# Patient Record
Sex: FEMALE | Race: WHITE | Employment: PART TIME | URBAN - METROPOLITAN AREA
[De-identification: names, ages, dates, MRNs, and addresses within clinical notes are randomized per-mention and may not be internally consistent; named-entity substitution may affect disease eponyms.]

---

## 2017-03-13 ENCOUNTER — PATIENT MESSAGE (OUTPATIENT)
Dept: INTERNAL MEDICINE CLINIC | Facility: CLINIC | Age: 57
End: 2017-03-13

## 2017-03-13 DIAGNOSIS — Z12.31 VISIT FOR SCREENING MAMMOGRAM: Primary | ICD-10-CM

## 2017-03-13 NOTE — TELEPHONE ENCOUNTER
From: Jael Avina  To: Denise Philip PA-C  Sent: 3/13/2017 7:42 AM CDT  Subject: Prescription Question    Eugenio Mejia,   I have 2 questions for you.  The first is regarding my mammogram. I received a reminder to schedule my annual exam but the let

## 2017-04-03 ENCOUNTER — HOSPITAL ENCOUNTER (OUTPATIENT)
Dept: MAMMOGRAPHY | Age: 57
Discharge: HOME | End: 2017-04-03
Attending: PHYSICIAN ASSISTANT
Payer: COMMERCIAL

## 2017-04-03 DIAGNOSIS — Z12.31 VISIT FOR SCREENING MAMMOGRAM: ICD-10-CM

## 2017-04-03 PROCEDURE — 77067 SCR MAMMO BI INCL CAD: CPT

## 2017-04-25 NOTE — PROGRESS NOTES
HPI:    Patient ID: Smith Pearl is a 61year old female. HPI  Here for worsening insomnia. Awakens at 3-4 am daily. Tried sleep hygeine. Did not help. Never started eleavil. Diazepam helping the best with insomnia. Was having left back pain.  We capsule by mouth daily.  Disp:  Rfl: 3   CALCIUM + D OR 8 tabs daily Disp:  Rfl:    MULTIPLE VITAMIN OR CAPS daily Disp:  Rfl:    SYNTHROID 150 MCG OR TABS 7 DAYS A WEEK Disp:  Rfl:      Allergies:No Known Allergies   PHYSICAL EXAM:   Physical Exam   Consti

## 2017-04-25 NOTE — PATIENT INSTRUCTIONS
Treating Insomnia  Good sleeping habits are a key part of treatment. If needed, some medications may help you sleep better at first. Making healthy lifestyle changes and learning to relax can improve your sleep.  Treating insomnia takes commitment, but tr · Deep breathing. Sit or lie back in a chair. Take a slow, deep breath. Hold it for 5 counts. Then breathe out slowly through your mouth. Keep doing this until you feel relaxed. · Progressive muscle relaxation.  Tense and then relax the muscles in your bod

## 2017-07-28 ENCOUNTER — TELEPHONE (OUTPATIENT)
Dept: INTERNAL MEDICINE CLINIC | Facility: CLINIC | Age: 57
End: 2017-07-28

## 2017-07-28 NOTE — TELEPHONE ENCOUNTER
referral   Received:  Today   Message Contents   Esthela Roque [facility] Clinical Staff Cc: [facility] Referral Pool   Phone Number: [phone]             .Reason for the order/referral: referral for sleep apnea   PCP: [email or web address]   Refer to [facility]

## 2017-07-31 ENCOUNTER — OFFICE VISIT (OUTPATIENT)
Dept: INTERNAL MEDICINE CLINIC | Facility: CLINIC | Age: 57
End: 2017-07-31

## 2017-07-31 VITALS
WEIGHT: 149.5 LBS | OXYGEN SATURATION: 97 % | DIASTOLIC BLOOD PRESSURE: 70 MMHG | HEIGHT: 68 IN | HEART RATE: 67 BPM | RESPIRATION RATE: 16 BRPM | BODY MASS INDEX: 22.66 KG/M2 | TEMPERATURE: 98 F | SYSTOLIC BLOOD PRESSURE: 110 MMHG

## 2017-07-31 DIAGNOSIS — G47.10 HYPERSOMNIA: Primary | ICD-10-CM

## 2017-07-31 DIAGNOSIS — F51.04 PSYCHOPHYSIOLOGICAL INSOMNIA: ICD-10-CM

## 2017-07-31 PROCEDURE — 99214 OFFICE O/P EST MOD 30 MIN: CPT | Performed by: INTERNAL MEDICINE

## 2017-07-31 RX ORDER — CHOLECALCIFEROL (VITAMIN D3) 125 MCG
1 CAPSULE ORAL NIGHTLY
COMMUNITY

## 2017-07-31 NOTE — PATIENT INSTRUCTIONS
Sleep and Women: Life Stages  A woman goes through many stages during her lifetime. These stages are a natural part of being a woman. Physical and emotional changes take place during the menstrual cycle, pregnancy, motherhood, and menopause.  These change · Try to adjust your baby’s sleep to fit a day-night cycle. At night, have lights dim and the setting quiet. During the day, keep your baby active longer. Then he or she will sleep better at night. · Take a daily walk with your baby.  Fresh air and dayligh

## 2017-07-31 NOTE — PROGRESS NOTES
HPI:    Patient ID: Seamus Gallagher is a 61year old female. HPI  Follow up on insomnia. Had been doing well on doxepin and not needed to use diazepam for insomnia.  Recently sleep onset has been controlled but now pt has been awakening at 3 am and Jose Alberto Sol 5 MG Oral Tab TAKE ONE TABLET BY MOUTH EVERY 8 HOURS AS NEEDED FOR MUSCLE SPASM Disp: 30 tablet Rfl: 1   naproxen 500 MG Oral Tab Take 1 tablet (500 mg total) by mouth 2 (two) times daily with meals.  Disp: 60 tablet Rfl: 0   calciTRIOL (ROCALTROL) 0.25 MCG

## 2017-08-15 DIAGNOSIS — F51.04 CHRONIC INSOMNIA: ICD-10-CM

## 2017-08-15 RX ORDER — DOXEPIN HYDROCHLORIDE 25 MG/1
25 CAPSULE ORAL NIGHTLY
Qty: 30 CAPSULE | Refills: 2 | Status: SHIPPED | OUTPATIENT
Start: 2017-08-15 | End: 2017-11-09

## 2017-10-15 ENCOUNTER — OFFICE VISIT (OUTPATIENT)
Dept: SLEEP CENTER | Facility: HOSPITAL | Age: 57
End: 2017-10-15
Attending: INTERNAL MEDICINE
Payer: COMMERCIAL

## 2017-10-15 PROCEDURE — 95806 SLEEP STUDY UNATT&RESP EFFT: CPT

## 2017-10-17 ENCOUNTER — HOSPITAL ENCOUNTER (OUTPATIENT)
Dept: ULTRASOUND IMAGING | Age: 57
Discharge: HOME | End: 2017-10-17
Attending: INTERNAL MEDICINE
Payer: COMMERCIAL

## 2017-10-17 DIAGNOSIS — C73 MALIGNANT NEOPLASM OF THYROID GLAND (HCC): ICD-10-CM

## 2017-10-17 PROCEDURE — 76536 US EXAM OF HEAD AND NECK: CPT | Performed by: INTERNAL MEDICINE

## 2017-10-18 NOTE — PROCEDURES
[address]       Accredited by the [facility] ([facility])    PATIENT'S NAME:        Navdeep Miramontes  ATTENDING PHYSICIAN:   Ari Robb M.D. REFERRING PHYSICIAN:   CLOVER Singh patient's chief complaint is insomnia. RECOMMENDATIONS:    1.    In the absence of significant sleep pathology as demonstrated during this home sleep study evaluation, further clinical correlation is necessary to determine etiology of patient's ongoing s

## 2017-10-27 ENCOUNTER — PATIENT MESSAGE (OUTPATIENT)
Dept: INTERNAL MEDICINE CLINIC | Facility: CLINIC | Age: 57
End: 2017-10-27

## 2017-10-27 NOTE — TELEPHONE ENCOUNTER
From: Aiden Fritz  To: Garret Krishnamurthy MD  Sent: 10/27/2017 12:17 PM CDT  Subject: Test Results Question    I had my sleep study done on October 15 th at the [facility] on [address] [place] [address].  I am wondering if the results have come back yet and was t

## 2017-10-27 NOTE — TELEPHONE ENCOUNTER
Per Dr. Ximena Rosado,   RECOMMENDATIONS:    1. In the absence of significant sleep pathology as demonstrated during this home sleep study evaluation, further clinical correlation is necessary to determine etiology of patient's ongoing symptoms.   If there

## 2017-11-09 DIAGNOSIS — F51.04 CHRONIC INSOMNIA: ICD-10-CM

## 2017-11-09 RX ORDER — DOXEPIN HYDROCHLORIDE 25 MG/1
CAPSULE ORAL
Qty: 30 CAPSULE | Refills: 0 | Status: SHIPPED | OUTPATIENT
Start: 2017-11-09 | End: 2017-12-19 | Stop reason: DRUGHIGH

## 2017-12-19 NOTE — PROGRESS NOTES
HPI:    Patient ID: Rober Burt is a 61year old female. HPI     Follow up after sleep study showed no significant sleep apnea. Pt on doxepin nightly 25mg and insomnia is not well controlled. Also takes magnesium, melatonin.      C/o frequent wakin Neck: Normal range of motion. Cardiovascular: Normal rate, regular rhythm, normal heart sounds and intact distal pulses. No murmur heard. Pulmonary/Chest: Effort normal and breath sounds normal. She has no wheezes. She has no rales.    Neurological:

## 2017-12-21 NOTE — PATIENT INSTRUCTIONS
Start increased dose of doxepin for insomnia  What can I do to improve my insomnia? — You can follow good “sleep hygiene.” That means that you:  ? Sleep only long enough to feel rested and then get out of bed  ? Go to bed and get up at the same time every da at first, it disrupts sleep later in the night. All topics are updated as new evidence becomes available and our peer review process is complete. This topic retrieved from orat.ioDate on: May 22, 2015.

## 2017-12-29 ENCOUNTER — MED REC SCAN ONLY (OUTPATIENT)
Dept: INTERNAL MEDICINE CLINIC | Facility: CLINIC | Age: 57
End: 2017-12-29

## 2018-05-02 ENCOUNTER — OFFICE VISIT (OUTPATIENT)
Dept: INTERNAL MEDICINE CLINIC | Facility: CLINIC | Age: 58
End: 2018-05-02

## 2018-05-02 VITALS
TEMPERATURE: 99 F | DIASTOLIC BLOOD PRESSURE: 60 MMHG | HEIGHT: 68 IN | RESPIRATION RATE: 16 BRPM | SYSTOLIC BLOOD PRESSURE: 100 MMHG | HEART RATE: 76 BPM | BODY MASS INDEX: 24.1 KG/M2 | WEIGHT: 159 LBS

## 2018-05-02 DIAGNOSIS — F51.04 PSYCHOPHYSIOLOGICAL INSOMNIA: ICD-10-CM

## 2018-05-02 DIAGNOSIS — K64.4 EXTERNAL HEMORRHOIDS: Primary | ICD-10-CM

## 2018-05-02 DIAGNOSIS — H81.11 BENIGN PAROXYSMAL POSITIONAL VERTIGO OF RIGHT EAR: ICD-10-CM

## 2018-05-02 PROCEDURE — 99214 OFFICE O/P EST MOD 30 MIN: CPT | Performed by: PHYSICIAN ASSISTANT

## 2018-05-02 RX ORDER — PREDNISONE 20 MG/1
20 TABLET ORAL 2 TIMES DAILY
Qty: 14 TABLET | Refills: 0 | Status: SHIPPED | OUTPATIENT
Start: 2018-05-02 | End: 2018-05-09

## 2018-05-05 NOTE — PATIENT INSTRUCTIONS
Continue current medications  If vertigo returns: take prednisone as directed (with food)  Use proctofoam three times daily as needed for hemorrhoids.  If bleeding continues, we will recommend a follow-up with gastroenterologist

## 2018-05-05 NOTE — PROGRESS NOTES
HPI:    Patient ID: Luda Bundy is a 61year old female.   Patient presents with:  Vertigo: walked into closet door during the night and bumped forehead, has dizziness when she lies down and when sitting up, noticing when she types she is flipping let issues. Review of Systems   Constitutional: Negative for activity change, appetite change, diaphoresis, fatigue, fever and unexpected weight change. HENT: Negative for congestion and sore throat. Eyes: Negative for photophobia and pain.    Respir Disp:  Rfl:      Allergies:No Known Allergies   PHYSICAL EXAM:   Physical Exam   Nursing note and vitals reviewed. Constitutional: She is oriented to person, place, and time. She appears well-developed and well-nourished. No distress.    HENT:   Head: Nor Tab 14 tablet 0      Sig: Take 1 tablet (20 mg total) by mouth 2 (two) times daily.       Pramoxine-HC 1-1 % External Foam 10 g 1      Sig: Apply rectally 3 times daily as needed           Imaging & Referrals:  None         IT#[id number]

## 2018-05-21 ENCOUNTER — NURSE ONLY (OUTPATIENT)
Dept: INTERNAL MEDICINE CLINIC | Facility: CLINIC | Age: 58
End: 2018-05-21

## 2018-05-21 DIAGNOSIS — Z23 NEED FOR SHINGLES VACCINE: Primary | ICD-10-CM

## 2018-05-21 PROCEDURE — 90471 IMMUNIZATION ADMIN: CPT | Performed by: INTERNAL MEDICINE

## 2018-05-21 PROCEDURE — 90750 HZV VACC RECOMBINANT IM: CPT | Performed by: INTERNAL MEDICINE

## 2018-06-23 RX ORDER — DOXEPIN HYDROCHLORIDE 50 MG/1
50 CAPSULE ORAL NIGHTLY
Qty: 30 CAPSULE | Refills: 5 | Status: CANCELLED
Start: 2018-06-23

## 2018-06-23 NOTE — TELEPHONE ENCOUNTER
From: Michael Ibarra  Sent: 6/23/2018 10:19 AM CDT  Subject: Medication Renewal Request    Neva Jaramillo.  Dania Carrasquillo would like a refill of the following medications:     Doxepin HCl 50 MG Oral Cap Rosanne Cee PA-C]    Preferred pharmacy: [facility]

## 2018-07-13 ENCOUNTER — HOSPITAL ENCOUNTER (OUTPATIENT)
Dept: MAMMOGRAPHY | Age: 58
Discharge: HOME | End: 2018-07-13
Attending: PHYSICIAN ASSISTANT
Payer: COMMERCIAL

## 2018-07-13 DIAGNOSIS — Z12.31 ENCOUNTER FOR SCREENING MAMMOGRAM FOR MALIGNANT NEOPLASM OF BREAST: ICD-10-CM

## 2018-07-13 PROCEDURE — 77063 BREAST TOMOSYNTHESIS BI: CPT | Performed by: PHYSICIAN ASSISTANT

## 2018-07-13 PROCEDURE — 77067 SCR MAMMO BI INCL CAD: CPT | Performed by: PHYSICIAN ASSISTANT

## 2018-08-06 ENCOUNTER — NURSE ONLY (OUTPATIENT)
Dept: INTERNAL MEDICINE CLINIC | Facility: CLINIC | Age: 58
End: 2018-08-06
Payer: COMMERCIAL

## 2018-08-06 DIAGNOSIS — Z23 NEED FOR SHINGLES VACCINE: Primary | ICD-10-CM

## 2018-08-06 PROCEDURE — 90471 IMMUNIZATION ADMIN: CPT | Performed by: INTERNAL MEDICINE

## 2018-08-06 PROCEDURE — 90750 HZV VACC RECOMBINANT IM: CPT | Performed by: INTERNAL MEDICINE

## 2018-10-30 ENCOUNTER — OFFICE VISIT (OUTPATIENT)
Dept: INTERNAL MEDICINE CLINIC | Facility: CLINIC | Age: 58
End: 2018-10-30
Payer: COMMERCIAL

## 2018-10-30 VITALS
HEIGHT: 68 IN | TEMPERATURE: 98 F | SYSTOLIC BLOOD PRESSURE: 108 MMHG | DIASTOLIC BLOOD PRESSURE: 70 MMHG | WEIGHT: 165 LBS | HEART RATE: 60 BPM | BODY MASS INDEX: 25.01 KG/M2 | RESPIRATION RATE: 16 BRPM

## 2018-10-30 DIAGNOSIS — G57.02 PIRIFORMIS SYNDROME OF LEFT SIDE: ICD-10-CM

## 2018-10-30 PROCEDURE — 99213 OFFICE O/P EST LOW 20 MIN: CPT | Performed by: PHYSICIAN ASSISTANT

## 2018-10-30 RX ORDER — DIAZEPAM 5 MG/1
TABLET ORAL
Qty: 30 TABLET | Refills: 0 | Status: SHIPPED | OUTPATIENT
Start: 2018-10-30 | End: 2018-12-27

## 2018-10-30 RX ORDER — MELOXICAM 15 MG/1
15 TABLET ORAL DAILY
Qty: 14 TABLET | Refills: 0 | Status: SHIPPED | OUTPATIENT
Start: 2018-10-30 | End: 2019-07-15 | Stop reason: ALTCHOICE

## 2018-10-30 NOTE — PATIENT INSTRUCTIONS
Start physical therapy for piriformis syndrome  Take meloxicam once daily with food for pain  Use diazepam if necessary when pain still present

## 2018-10-30 NOTE — PROGRESS NOTES
HPI:    Patient ID: Marina Sheppard is a 60year old female. Patient presents with:  Hip Pain: left requesting PT order   Medication Request: diazepam     Hip Pain    Pain location: left buttock/hip. This is a recurrent problem. Episode onset: 2 months. D OR) Take 1 tablet by mouth daily. Disp:  Rfl:    Cyanocobalamin (VITAMIN B 12 OR) Take 1 tablet by mouth daily. Disp:  Rfl:    B Complex-C (SUPER B COMPLEX OR) Take 1 tablet by mouth daily.    Disp:  Rfl:    calciTRIOL (ROCALTROL) 0.25 MCG Oral Cap Ta Signed Prescriptions Disp Refills   • Meloxicam 15 MG Oral Tab 14 tablet 0     Sig: Take 1 tablet (15 mg total) by mouth daily.    • diazepam 5 MG Oral Tab 30 tablet 0     Sig: TAKE ONE TABLET BY MOUTH EVERY 8 HOURS AS NEEDED FOR MUSCLE SPASM       Imag

## 2018-12-04 DIAGNOSIS — F51.04 PSYCHOPHYSIOLOGICAL INSOMNIA: ICD-10-CM

## 2018-12-04 RX ORDER — DOXEPIN HYDROCHLORIDE 50 MG/1
CAPSULE ORAL
Qty: 30 CAPSULE | Refills: 0 | Status: SHIPPED | OUTPATIENT
Start: 2018-12-04 | End: 2019-02-25

## 2018-12-08 DIAGNOSIS — G57.02 PIRIFORMIS SYNDROME OF LEFT SIDE: ICD-10-CM

## 2018-12-10 DIAGNOSIS — G57.02 PIRIFORMIS SYNDROME OF LEFT SIDE: ICD-10-CM

## 2018-12-10 RX ORDER — DIAZEPAM 5 MG/1
TABLET ORAL
Qty: 30 TABLET | Refills: 0 | OUTPATIENT
Start: 2018-12-10

## 2018-12-10 NOTE — TELEPHONE ENCOUNTER
Per Dr. Mary Powell, the pt needs to be seen in the office for re-evaluation before further refills. The pharmacy was notified and the pt was informed of this via [address]

## 2018-12-10 NOTE — TELEPHONE ENCOUNTER
Rx not sent. Per Dr. Angela Scott, the pt is to be seen in the office for a recheck for further refills.

## 2018-12-27 DIAGNOSIS — G57.02 PIRIFORMIS SYNDROME OF LEFT SIDE: ICD-10-CM

## 2018-12-27 RX ORDER — DIAZEPAM 5 MG/1
TABLET ORAL
Qty: 30 TABLET | Refills: 2 | OUTPATIENT
Start: 2018-12-27 | End: 2019-07-15

## 2018-12-27 NOTE — TELEPHONE ENCOUNTER
Pt is requesting a refill for diazepam with additional refills for leg cramps at night. Ok to refill per Dr. Jean Claude Andrews. Rx called into the pharmacy.

## 2019-01-01 DIAGNOSIS — F51.04 PSYCHOPHYSIOLOGICAL INSOMNIA: ICD-10-CM

## 2019-01-02 RX ORDER — DOXEPIN HYDROCHLORIDE 50 MG/1
CAPSULE ORAL
Qty: 30 CAPSULE | Refills: 0 | OUTPATIENT
Start: 2019-01-02

## 2019-01-02 NOTE — TELEPHONE ENCOUNTER
Spoke with pt to make appt before we prescribe further refills, and pt did not want to schedule f/u appt. Pt states she would rather try to take otc meds for insomnia.

## 2019-02-25 ENCOUNTER — PATIENT MESSAGE (OUTPATIENT)
Dept: INTERNAL MEDICINE CLINIC | Facility: CLINIC | Age: 59
End: 2019-02-25

## 2019-02-25 DIAGNOSIS — F51.04 PSYCHOPHYSIOLOGICAL INSOMNIA: ICD-10-CM

## 2019-02-25 RX ORDER — DOXEPIN HYDROCHLORIDE 50 MG/1
CAPSULE ORAL
Qty: 30 CAPSULE | Refills: 0 | Status: CANCELLED | OUTPATIENT
Start: 2019-02-25

## 2019-02-25 RX ORDER — DOXEPIN HYDROCHLORIDE 50 MG/1
CAPSULE ORAL
Qty: 30 CAPSULE | Refills: 3 | Status: SHIPPED | OUTPATIENT
Start: 2019-02-25 | End: 2019-07-15

## 2019-02-25 NOTE — TELEPHONE ENCOUNTER
From: Antonio Bella  To: Robert Kasper PA-C  Sent: 2/25/2019 12:38 PM CST  Subject: Prescription Question    JACQUI Mejia!  I saw you the end of Oct. and now I need to refill my Doxepin ( I use for sleep 50 mg at night) They said I had to come back i

## 2019-06-20 ENCOUNTER — ORDER TRANSCRIPTION (OUTPATIENT)
Dept: ADMINISTRATIVE | Facility: HOSPITAL | Age: 59
End: 2019-06-20

## 2019-06-20 ENCOUNTER — PATIENT MESSAGE (OUTPATIENT)
Dept: INTERNAL MEDICINE CLINIC | Facility: CLINIC | Age: 59
End: 2019-06-20

## 2019-06-20 DIAGNOSIS — Z12.31 ENCOUNTER FOR SCREENING MAMMOGRAM FOR MALIGNANT NEOPLASM OF BREAST: Primary | ICD-10-CM

## 2019-06-20 NOTE — TELEPHONE ENCOUNTER
From: Reena Benson  To: Tammy Torres PA-C  Sent: 6/20/2019 10:50 AM CDT  Subject: Non-Urgent Medical Question    I need to schedule an annual exam. I have a mammogram appointment made for July 15 & would like to come in some time after to see JOSE RAFAEL

## 2019-06-22 ENCOUNTER — HOSPITAL ENCOUNTER (OUTPATIENT)
Dept: ULTRASOUND IMAGING | Age: 59
Discharge: HOME | End: 2019-06-22
Attending: INTERNAL MEDICINE
Payer: COMMERCIAL

## 2019-06-22 DIAGNOSIS — C73 THYROID CANCER (HCC): ICD-10-CM

## 2019-06-22 PROCEDURE — 76536 US EXAM OF HEAD AND NECK: CPT | Performed by: INTERNAL MEDICINE

## 2019-07-15 ENCOUNTER — OFFICE VISIT (OUTPATIENT)
Dept: INTERNAL MEDICINE CLINIC | Facility: CLINIC | Age: 59
End: 2019-07-15
Payer: COMMERCIAL

## 2019-07-15 ENCOUNTER — HOSPITAL ENCOUNTER (OUTPATIENT)
Dept: MAMMOGRAPHY | Age: 59
Discharge: HOME | End: 2019-07-15
Attending: PHYSICIAN ASSISTANT
Payer: COMMERCIAL

## 2019-07-15 VITALS
RESPIRATION RATE: 12 BRPM | SYSTOLIC BLOOD PRESSURE: 106 MMHG | BODY MASS INDEX: 20 KG/M2 | HEIGHT: 68 IN | TEMPERATURE: 98 F | WEIGHT: 132 LBS | HEART RATE: 84 BPM | DIASTOLIC BLOOD PRESSURE: 64 MMHG

## 2019-07-15 DIAGNOSIS — E89.2 SURGICAL HYPOPARATHYROIDISM (HCC): ICD-10-CM

## 2019-07-15 DIAGNOSIS — R25.2 LEG CRAMPING: ICD-10-CM

## 2019-07-15 DIAGNOSIS — Z12.4 SCREENING FOR MALIGNANT NEOPLASM OF CERVIX: ICD-10-CM

## 2019-07-15 DIAGNOSIS — M85.80 OSTEOPENIA, UNSPECIFIED LOCATION: ICD-10-CM

## 2019-07-15 DIAGNOSIS — F51.04 PSYCHOPHYSIOLOGICAL INSOMNIA: ICD-10-CM

## 2019-07-15 DIAGNOSIS — Z12.31 ENCOUNTER FOR SCREENING MAMMOGRAM FOR MALIGNANT NEOPLASM OF BREAST: ICD-10-CM

## 2019-07-15 DIAGNOSIS — Z85.850 HISTORY OF THYROID CANCER: ICD-10-CM

## 2019-07-15 DIAGNOSIS — G57.02 PIRIFORMIS SYNDROME OF LEFT SIDE: ICD-10-CM

## 2019-07-15 DIAGNOSIS — Z00.00 ROUTINE PHYSICAL EXAMINATION: Primary | ICD-10-CM

## 2019-07-15 DIAGNOSIS — Z01.89 ENCOUNTER FOR ROUTINE LABORATORY TESTING: ICD-10-CM

## 2019-07-15 PROCEDURE — 99396 PREV VISIT EST AGE 40-64: CPT | Performed by: PHYSICIAN ASSISTANT

## 2019-07-15 PROCEDURE — 88175 CYTOPATH C/V AUTO FLUID REDO: CPT | Performed by: PHYSICIAN ASSISTANT

## 2019-07-15 PROCEDURE — 77067 SCR MAMMO BI INCL CAD: CPT | Performed by: PHYSICIAN ASSISTANT

## 2019-07-15 PROCEDURE — 87624 HPV HI-RISK TYP POOLED RSLT: CPT | Performed by: PHYSICIAN ASSISTANT

## 2019-07-15 PROCEDURE — 77063 BREAST TOMOSYNTHESIS BI: CPT | Performed by: PHYSICIAN ASSISTANT

## 2019-07-15 RX ORDER — LEVOTHYROXINE SODIUM 137 UG/1
137 TABLET ORAL
COMMUNITY
End: 2019-08-12

## 2019-07-15 RX ORDER — DOXEPIN HYDROCHLORIDE 50 MG/1
CAPSULE ORAL
Qty: 30 CAPSULE | Refills: 3 | Status: SHIPPED | OUTPATIENT
Start: 2019-07-15 | End: 2019-11-07

## 2019-07-15 RX ORDER — POTASSIUM CITRATE 15 MEQ/1
2 TABLET, EXTENDED RELEASE ORAL DAILY
COMMUNITY

## 2019-07-15 RX ORDER — ZOLPIDEM TARTRATE 5 MG/1
1 TABLET ORAL NIGHTLY PRN
Refills: 3 | COMMUNITY
Start: 2019-04-25 | End: 2020-07-17

## 2019-07-15 RX ORDER — DIAZEPAM 5 MG/1
TABLET ORAL
Qty: 30 TABLET | Refills: 2 | Status: SHIPPED | OUTPATIENT
Start: 2019-07-15 | End: 2020-07-17

## 2019-07-15 NOTE — PROGRESS NOTES
Wellness Exam    CC: Patient is presenting for a wellness exam    HPI:   Concerns: keto diet x several months, sees endo for thyroid management and since starting the diet has had success with wt loss, feeling well.  Requesting parathyroid labs check  Insom • MARCOS BIOPSY STEREOTACTIC NODULE 2 SITE BILAT  2012    •   x3   • OTHER SURGICAL HISTORY      Malignant Thyroid tumor   • REMOVAL HARDWARE LOWER EXTREMITY Left 2009    Performed by Lashon Madrigal MD at [address] [place] [address] urgency, frequency of urination, and abnormal vaginal bleeding. Musculoskeletal: Negative for arthralgias and gait problem. Skin: Negative for color change and rash. Neurological: Negative for tremors, weakness and numbness.    Hematological: Negative and affect and her behavior is normal.     Assessment and Plan:  Tiffany Dominguez is a 60year old female here for a wellness exam.  Age appropriate cancer screening, labs, safety, immunizations were discussed with the patient and ordered as follows:

## 2019-07-16 LAB — HPV I/H RISK 1 DNA SPEC QL NAA+PROBE: NEGATIVE

## 2019-07-17 LAB
LAST PAP RESULT: NORMAL
PAP HISTORY (OTHER THAN LAST PAP): NORMAL

## 2019-07-18 LAB
ABSOLUTE BASOPHILS: 49 CELLS/UL (ref 0–200)
ABSOLUTE EOSINOPHILS: 238 CELLS/UL (ref 15–500)
ABSOLUTE LYMPHOCYTES: 951 CELLS/UL (ref 850–3900)
ABSOLUTE MONOCYTES: 278 CELLS/UL (ref 200–950)
ABSOLUTE NEUTROPHILS: 1383 CELLS/UL (ref 1500–7800)
ALBUMIN/GLOBULIN RATIO: 2.1 (CALC) (ref 1–2.5)
ALBUMIN: 4.5 G/DL (ref 3.6–5.1)
ALKALINE PHOSPHATASE: 45 U/L (ref 33–130)
ALT: 15 U/L (ref 6–29)
APPEARANCE: CLEAR
AST: 15 U/L (ref 10–35)
BASOPHILS: 1.7 %
BILIRUBIN, TOTAL: 0.5 MG/DL (ref 0.2–1.2)
BILIRUBIN: NEGATIVE
BUN: 24 MG/DL (ref 7–25)
CALCIUM, IONIZED: 4.7 MG/DL (ref 4.8–5.6)
CALCIUM: 9.2 MG/DL (ref 8.6–10.4)
CARBON DIOXIDE: 30 MMOL/L (ref 20–32)
CHLORIDE: 101 MMOL/L (ref 98–110)
CHOL/HDLC RATIO: 3.4 (CALC)
CHOLESTEROL, TOTAL: 294 MG/DL
COLOR: YELLOW
CREATININE: 0.58 MG/DL (ref 0.5–0.99)
EGFR IF AFRICN AM: 116 ML/MIN/1.73M2
EGFR IF NONAFRICN AM: 100 ML/MIN/1.73M2
EOSINOPHILS: 8.2 %
FERRITIN: 13 NG/ML (ref 16–232)
GLOBULIN: 2.1 G/DL (CALC) (ref 1.9–3.7)
GLUCOSE: 99 MG/DL (ref 65–99)
GLUCOSE: NEGATIVE
HDL CHOLESTEROL: 86 MG/DL
HEMATOCRIT: 36.4 % (ref 35–45)
HEMOGLOBIN A1C: 5.2 % OF TOTAL HGB
HEMOGLOBIN: 11.8 G/DL (ref 11.7–15.5)
LDL-CHOLESTEROL: 193 MG/DL (CALC)
LEUKOCYTE ESTERASE: NEGATIVE
LYMPHOCYTES: 32.8 %
MAGNESIUM: 2.2 MG/DL (ref 1.5–2.5)
MCH: 29.9 PG (ref 27–33)
MCHC: 32.4 G/DL (ref 32–36)
MCV: 92.2 FL (ref 80–100)
MONOCYTES: 9.6 %
MPV: 10.2 FL (ref 7.5–12.5)
NEUTROPHILS: 47.7 %
NITRITE: NEGATIVE
NON-HDL CHOLESTEROL: 208 MG/DL (CALC)
OCCULT BLOOD: NEGATIVE
PARATHYROID HORMONE,$INTACT: 22 PG/ML (ref 14–64)
PH: 6 (ref 5–8)
PLATELET COUNT: 217 THOUSAND/UL (ref 140–400)
POTASSIUM: 4 MMOL/L (ref 3.5–5.3)
PROTEIN, TOTAL: 6.6 G/DL (ref 6.1–8.1)
PROTEIN: NEGATIVE
RDW: 13.1 % (ref 11–15)
RED BLOOD CELL COUNT: 3.95 MILLION/UL (ref 3.8–5.1)
SODIUM: 140 MMOL/L (ref 135–146)
SPECIFIC GRAVITY: 1.03 (ref 1–1.03)
T4, FREE: 2 NG/DL (ref 0.8–1.8)
TRIGLYCERIDES: 58 MG/DL
TSH W/REFLEX TO FT4: 0.2 MIU/L (ref 0.4–4.5)
VITAMIN D, 25-OH, TOTAL: 51 NG/ML (ref 30–100)
WHITE BLOOD CELL COUNT: 2.9 THOUSAND/UL (ref 3.8–10.8)

## 2019-07-19 ENCOUNTER — TELEPHONE (OUTPATIENT)
Dept: INTERNAL MEDICINE CLINIC | Facility: CLINIC | Age: 59
End: 2019-07-19

## 2019-07-19 DIAGNOSIS — R79.0 LOW SERUM FERRITIN LEVEL: Primary | ICD-10-CM

## 2019-07-19 RX ORDER — PNV NO.95/FERROUS FUM/FOLIC AC 28MG-0.8MG
1 TABLET ORAL DAILY
COMMUNITY
Start: 2019-07-19

## 2019-07-19 NOTE — TELEPHONE ENCOUNTER
The pt is aware and the medication list was updated.      Recommendations were sent to the pt through [employer] per pt request.

## 2019-07-19 NOTE — TELEPHONE ENCOUNTER
----- Message from Luana Cuevas PA-C sent at 7/18/2019  9:45 PM CDT -----  Please inform pt: no anemia, no evidence of DM, kidney/liver disease, UA clear, magnesium, PTH, vitamin D wnl.    Significant elevation of LDL due to keto diet- consider marin

## 2019-11-07 ENCOUNTER — PATIENT MESSAGE (OUTPATIENT)
Dept: INTERNAL MEDICINE CLINIC | Facility: CLINIC | Age: 59
End: 2019-11-07

## 2019-11-07 DIAGNOSIS — F51.04 PSYCHOPHYSIOLOGICAL INSOMNIA: ICD-10-CM

## 2019-11-07 RX ORDER — DOXEPIN HYDROCHLORIDE 50 MG/1
CAPSULE ORAL
Qty: 90 CAPSULE | Refills: 1 | Status: SHIPPED | OUTPATIENT
Start: 2019-11-07 | End: 2020-06-01

## 2019-11-07 NOTE — TELEPHONE ENCOUNTER
From: Reema End  To: Lisa Jarrett PA-C  Sent: 11/7/2019 12:12 PM CST  Subject: Prescription Question    Hi! I have new insurance that offers mail order prescription service with much better pricing!  The Doxepin is helping with sleep, and I ca

## 2020-06-01 DIAGNOSIS — F51.04 PSYCHOPHYSIOLOGICAL INSOMNIA: ICD-10-CM

## 2020-06-01 RX ORDER — DOXEPIN HYDROCHLORIDE 50 MG/1
CAPSULE ORAL
Qty: 90 CAPSULE | Refills: 0 | Status: SHIPPED | OUTPATIENT
Start: 2020-06-01 | End: 2020-09-14

## 2020-07-17 ENCOUNTER — OFFICE VISIT (OUTPATIENT)
Dept: INTERNAL MEDICINE CLINIC | Facility: CLINIC | Age: 60
End: 2020-07-17
Payer: COMMERCIAL

## 2020-07-17 VITALS
DIASTOLIC BLOOD PRESSURE: 70 MMHG | TEMPERATURE: 97 F | SYSTOLIC BLOOD PRESSURE: 118 MMHG | OXYGEN SATURATION: 99 % | BODY MASS INDEX: 21.03 KG/M2 | HEIGHT: 67 IN | WEIGHT: 134 LBS | HEART RATE: 64 BPM | RESPIRATION RATE: 18 BRPM

## 2020-07-17 DIAGNOSIS — Z13.0 SCREENING FOR ENDOCRINE, NUTRITIONAL, METABOLIC AND IMMUNITY DISORDER: ICD-10-CM

## 2020-07-17 DIAGNOSIS — Z13.228 SCREENING FOR ENDOCRINE, NUTRITIONAL, METABOLIC AND IMMUNITY DISORDER: ICD-10-CM

## 2020-07-17 DIAGNOSIS — Z13.89 SCREENING FOR GENITOURINARY CONDITION: ICD-10-CM

## 2020-07-17 DIAGNOSIS — Z12.31 ENCOUNTER FOR SCREENING MAMMOGRAM FOR BREAST CANCER: ICD-10-CM

## 2020-07-17 DIAGNOSIS — Z13.220 SCREENING FOR LIPID DISORDERS: ICD-10-CM

## 2020-07-17 DIAGNOSIS — Z13.29 SCREENING FOR ENDOCRINE, NUTRITIONAL, METABOLIC AND IMMUNITY DISORDER: ICD-10-CM

## 2020-07-17 DIAGNOSIS — Z00.00 ANNUAL PHYSICAL EXAM: Primary | ICD-10-CM

## 2020-07-17 DIAGNOSIS — Z13.21 SCREENING FOR ENDOCRINE, NUTRITIONAL, METABOLIC AND IMMUNITY DISORDER: ICD-10-CM

## 2020-07-17 DIAGNOSIS — G57.02 PIRIFORMIS SYNDROME OF LEFT SIDE: ICD-10-CM

## 2020-07-17 DIAGNOSIS — Z13.1 SCREENING FOR DIABETES MELLITUS: ICD-10-CM

## 2020-07-17 PROCEDURE — 3078F DIAST BP <80 MM HG: CPT | Performed by: NURSE PRACTITIONER

## 2020-07-17 PROCEDURE — 3008F BODY MASS INDEX DOCD: CPT | Performed by: NURSE PRACTITIONER

## 2020-07-17 PROCEDURE — 3074F SYST BP LT 130 MM HG: CPT | Performed by: NURSE PRACTITIONER

## 2020-07-17 PROCEDURE — 99396 PREV VISIT EST AGE 40-64: CPT | Performed by: NURSE PRACTITIONER

## 2020-07-17 RX ORDER — DIAZEPAM 5 MG/1
TABLET ORAL
Qty: 30 TABLET | Refills: 2 | Status: SHIPPED | OUTPATIENT
Start: 2020-07-17

## 2020-07-17 RX ORDER — ZOLPIDEM TARTRATE 5 MG/1
5 TABLET ORAL NIGHTLY PRN
Qty: 30 TABLET | Refills: 3 | Status: SHIPPED | OUTPATIENT
Start: 2020-07-17

## 2020-07-17 NOTE — PROGRESS NOTES
Wellness Exam    CC: Patient is presenting for a wellness exam    HPI:   Concerns: Feeling well. Follows with endo regarding hypoparathyroidism and hx of thyroid CA. Insomnia has been stable, using Ambien or diazepam as needed.      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 Review of Systems   Constitutional: Negative for fever, chills and fatigue. HENT: Negative for hearing loss, congestion, sore throat and neck pain. Eyes: Negative for pain and visual disturbance.    Respiratory: Negative f oriented to person, place, and time. DTRs are +2 and symmetric. Cranial nerves grossly intact. Skin: Skin is warm. No rash noted. No erythema, pallor or jaundice.    Psychiatric: She has a normal mood and affect and her behavior is normal.     Assessment a

## 2020-07-17 NOTE — PATIENT INSTRUCTIONS
Prevention Guidelines, Women Ages 48 to 59  Screening tests and vaccines are an important part of managing your health. A screening test is done to find possible disorders or diseases in people who don't have any symptoms.  The goal is to find a disease e Colorectal cancer All women at average risk in this age group Multiple tests are available and are used at different times.  Possible tests include:  · Flexible sigmoidoscopy every 5 years, or  · Colonoscopy every 10 years, or  · CT colonography (virtual co Chickenpox (varicella) All women in this age group who have no record of this infection or vaccine 2 doses; the second dose should be given at least 4 weeks after the first dose   Hepatitis A Women at increased risk for infection – talk with your healthcar Diet and exercise Women who are overweight or obese When diagnosed, and then at routine exams   Sexually transmitted infection prevention Women at increased risk for infection – talk with your healthcare provider At routine exams   Use of daily aspirin Wom No

## 2020-07-26 LAB
ABSOLUTE BASOPHILS: 60 CELLS/UL (ref 0–200)
ABSOLUTE EOSINOPHILS: 312 CELLS/UL (ref 15–500)
ABSOLUTE LYMPHOCYTES: 1407 CELLS/UL (ref 850–3900)
ABSOLUTE MONOCYTES: 280 CELLS/UL (ref 200–950)
ABSOLUTE NEUTROPHILS: 1442 CELLS/UL (ref 1500–7800)
ALBUMIN/GLOBULIN RATIO: 2.4 (CALC) (ref 1–2.5)
ALBUMIN: 4.6 G/DL (ref 3.6–5.1)
ALKALINE PHOSPHATASE: 38 U/L (ref 37–153)
ALT: 18 U/L (ref 6–29)
APPEARANCE: CLEAR
AST: 20 U/L (ref 10–35)
BASOPHILS: 1.7 %
BILIRUBIN, TOTAL: 0.5 MG/DL (ref 0.2–1.2)
BILIRUBIN: NEGATIVE
BUN/CREATININE RATIO: 47 (CALC) (ref 6–22)
BUN: 29 MG/DL (ref 7–25)
CALCIUM: 9.4 MG/DL (ref 8.6–10.4)
CARBON DIOXIDE: 30 MMOL/L (ref 20–32)
CHLORIDE: 104 MMOL/L (ref 98–110)
CHOL/HDLC RATIO: 3.3 (CALC)
CHOLESTEROL, TOTAL: 311 MG/DL
COLOR: YELLOW
CREATININE: 0.62 MG/DL (ref 0.5–0.99)
EGFR IF AFRICN AM: 113 ML/MIN/1.73M2
EGFR IF NONAFRICN AM: 97 ML/MIN/1.73M2
EOSINOPHILS: 8.9 %
GLOBULIN: 1.9 G/DL (CALC) (ref 1.9–3.7)
GLUCOSE: 96 MG/DL (ref 65–99)
GLUCOSE: NEGATIVE
HDL CHOLESTEROL: 93 MG/DL
HEMATOCRIT: 38.7 % (ref 35–45)
HEMOGLOBIN A1C: 5.2 % OF TOTAL HGB
HEMOGLOBIN: 12.8 G/DL (ref 11.7–15.5)
KETONES: NEGATIVE
LDL-CHOLESTEROL: 202 MG/DL (CALC)
LEUKOCYTE ESTERASE: NEGATIVE
LYMPHOCYTES: 40.2 %
MCH: 30.9 PG (ref 27–33)
MCHC: 33.1 G/DL (ref 32–36)
MCV: 93.5 FL (ref 80–100)
MONOCYTES: 8 %
MPV: 10.4 FL (ref 7.5–12.5)
NEUTROPHILS: 41.2 %
NITRITE: NEGATIVE
NON-HDL CHOLESTEROL: 218 MG/DL (CALC)
OCCULT BLOOD: NEGATIVE
PH: 5.5 (ref 5–8)
PLATELET COUNT: 213 THOUSAND/UL (ref 140–400)
POTASSIUM: 4 MMOL/L (ref 3.5–5.3)
PROTEIN, TOTAL: 6.5 G/DL (ref 6.1–8.1)
PROTEIN: NEGATIVE
RDW: 12.5 % (ref 11–15)
RED BLOOD CELL COUNT: 4.14 MILLION/UL (ref 3.8–5.1)
SODIUM: 142 MMOL/L (ref 135–146)
SPECIFIC GRAVITY: 1.02 (ref 1–1.03)
TRIGLYCERIDES: 62 MG/DL
TSH W/REFLEX TO FT4: 0.64 MIU/L (ref 0.4–4.5)
WHITE BLOOD CELL COUNT: 3.5 THOUSAND/UL (ref 3.8–10.8)

## 2020-07-27 PROBLEM — Z53.20 STATIN DECLINED: Status: ACTIVE | Noted: 2020-07-27

## 2020-08-03 ENCOUNTER — HOSPITAL ENCOUNTER (OUTPATIENT)
Dept: MAMMOGRAPHY | Age: 60
Discharge: HOME | End: 2020-08-03
Attending: NURSE PRACTITIONER
Payer: COMMERCIAL

## 2020-08-03 DIAGNOSIS — Z12.31 ENCOUNTER FOR SCREENING MAMMOGRAM FOR BREAST CANCER: ICD-10-CM

## 2020-08-03 PROCEDURE — 77067 SCR MAMMO BI INCL CAD: CPT | Performed by: NURSE PRACTITIONER

## 2020-08-03 PROCEDURE — 77063 BREAST TOMOSYNTHESIS BI: CPT | Performed by: NURSE PRACTITIONER

## 2020-09-14 DIAGNOSIS — F51.04 PSYCHOPHYSIOLOGICAL INSOMNIA: ICD-10-CM

## 2020-09-14 RX ORDER — DOXEPIN HYDROCHLORIDE 50 MG/1
CAPSULE ORAL
Qty: 90 CAPSULE | Refills: 0 | Status: SHIPPED | OUTPATIENT
Start: 2020-09-14 | End: 2021-01-27

## 2020-09-14 NOTE — TELEPHONE ENCOUNTER
Last time medication was refilled 6/1/2020  Quantity and number of refills 90 w/ 0   Last OV 7/17/2020  Next OV 7/2021

## 2021-01-27 DIAGNOSIS — F51.04 PSYCHOPHYSIOLOGICAL INSOMNIA: ICD-10-CM

## 2021-01-27 RX ORDER — DOXEPIN HYDROCHLORIDE 50 MG/1
CAPSULE ORAL
Qty: 90 CAPSULE | Refills: 1 | Status: SHIPPED | OUTPATIENT
Start: 2021-01-27 | End: 2021-07-27

## 2021-01-29 ENCOUNTER — TELEPHONE (OUTPATIENT)
Dept: INTERNAL MEDICINE CLINIC | Facility: CLINIC | Age: 61
End: 2021-01-29

## 2021-02-10 DIAGNOSIS — Z23 NEED FOR VACCINATION: ICD-10-CM

## 2021-02-11 ENCOUNTER — IMMUNIZATION (OUTPATIENT)
Dept: LAB | Age: 61
End: 2021-02-11
Attending: HOSPITALIST
Payer: COMMERCIAL

## 2021-02-11 DIAGNOSIS — Z23 NEED FOR VACCINATION: Primary | ICD-10-CM

## 2021-02-11 PROCEDURE — 0001A SARSCOV2 VAC 30MCG/0.3ML IM: CPT

## 2021-03-04 ENCOUNTER — IMMUNIZATION (OUTPATIENT)
Dept: LAB | Age: 61
End: 2021-03-04
Attending: HOSPITALIST
Payer: COMMERCIAL

## 2021-03-04 DIAGNOSIS — Z23 NEED FOR VACCINATION: Primary | ICD-10-CM

## 2021-03-04 PROCEDURE — 0002A SARSCOV2 VAC 30MCG/0.3ML IM: CPT

## 2021-06-08 ENCOUNTER — HOSPITAL ENCOUNTER (OUTPATIENT)
Dept: ULTRASOUND IMAGING | Age: 61
Discharge: HOME | End: 2021-06-08
Attending: INTERNAL MEDICINE
Payer: COMMERCIAL

## 2021-06-08 DIAGNOSIS — E89.0 POSTPROCEDURAL HYPOTHYROIDISM: ICD-10-CM

## 2021-06-08 DIAGNOSIS — E55.9 VITAMIN D DEFICIENCY: ICD-10-CM

## 2021-06-08 DIAGNOSIS — E05.00 GRAVES DISEASE: ICD-10-CM

## 2021-06-08 PROCEDURE — 76536 US EXAM OF HEAD AND NECK: CPT | Performed by: INTERNAL MEDICINE

## 2021-07-17 ENCOUNTER — OFFICE VISIT (OUTPATIENT)
Dept: FAMILY MEDICINE CLINIC | Facility: CLINIC | Age: 61
End: 2021-07-17
Payer: COMMERCIAL

## 2021-07-17 VITALS
HEART RATE: 93 BPM | HEIGHT: 67 IN | OXYGEN SATURATION: 97 % | WEIGHT: 132 LBS | BODY MASS INDEX: 20.72 KG/M2 | DIASTOLIC BLOOD PRESSURE: 72 MMHG | TEMPERATURE: 99 F | RESPIRATION RATE: 20 BRPM | SYSTOLIC BLOOD PRESSURE: 112 MMHG

## 2021-07-17 DIAGNOSIS — Z11.52 ENCOUNTER FOR SCREENING FOR COVID-19: ICD-10-CM

## 2021-07-17 DIAGNOSIS — L73.9 HAIR FOLLICLE INFECTION: Primary | ICD-10-CM

## 2021-07-17 LAB
OPERATOR ID: NORMAL
POCT LOT NUMBER: NORMAL
RAPID SARS-COV-2 BY PCR: NOT DETECTED

## 2021-07-17 PROCEDURE — 3078F DIAST BP <80 MM HG: CPT | Performed by: PHYSICIAN ASSISTANT

## 2021-07-17 PROCEDURE — 3008F BODY MASS INDEX DOCD: CPT | Performed by: PHYSICIAN ASSISTANT

## 2021-07-17 PROCEDURE — 3074F SYST BP LT 130 MM HG: CPT | Performed by: PHYSICIAN ASSISTANT

## 2021-07-17 PROCEDURE — 99213 OFFICE O/P EST LOW 20 MIN: CPT | Performed by: PHYSICIAN ASSISTANT

## 2021-07-17 PROCEDURE — U0002 COVID-19 LAB TEST NON-CDC: HCPCS | Performed by: PHYSICIAN ASSISTANT

## 2021-07-17 RX ORDER — SULFAMETHOXAZOLE AND TRIMETHOPRIM 800; 160 MG/1; MG/1
1 TABLET ORAL 2 TIMES DAILY
Qty: 14 TABLET | Refills: 0 | Status: SHIPPED | OUTPATIENT
Start: 2021-07-17 | End: 2021-07-24

## 2021-07-17 NOTE — PROGRESS NOTES
CHIEF COMPLAINT:   Patient presents with:  Fever: body ache, headache, ingrown hair left upper thigh        HPI:   Reema Rocha is a 57year old female who presents for fever. Fever stated early Friday morning at 1 am. + body aches and headache.  Davion Monreal [facility]) 2011    DCIS   • Carcinoma in situ of breast    • Hypocalcemia    • HYPOTHYROIDISM    • IBS    • Miscarriage 1990    had d&c   • Thyroid cancer ([address] 1981    had thyroidectomy      Social History:  Social History    Tobacco Use      Smoking status Sulfamethoxazole-TMP -160 MG Oral Tab per tablet 14 tablet 0     Sig: Take 1 tablet by mouth 2 (two) times daily for 7 days. Side effects, risks, benefits, of medication explained and discussed.     The patient indicates understanding of these iss

## 2021-07-17 NOTE — PATIENT INSTRUCTIONS
Warm compresses   Start antibiotic   Tylenol OTC as needed for pain/fever   If any increased redness, swelling, discharge or fever please go to IC   Please follow up with PCP if no improvement or if symptoms worsen

## 2021-07-27 ENCOUNTER — OFFICE VISIT (OUTPATIENT)
Dept: INTERNAL MEDICINE CLINIC | Facility: CLINIC | Age: 61
End: 2021-07-27
Payer: COMMERCIAL

## 2021-07-27 VITALS
BODY MASS INDEX: 20.56 KG/M2 | HEART RATE: 98 BPM | WEIGHT: 131 LBS | HEIGHT: 67 IN | TEMPERATURE: 99 F | DIASTOLIC BLOOD PRESSURE: 58 MMHG | RESPIRATION RATE: 16 BRPM | OXYGEN SATURATION: 98 % | SYSTOLIC BLOOD PRESSURE: 98 MMHG

## 2021-07-27 DIAGNOSIS — Z85.850 HISTORY OF THYROID CANCER: ICD-10-CM

## 2021-07-27 DIAGNOSIS — Z53.20 STATIN DECLINED: ICD-10-CM

## 2021-07-27 DIAGNOSIS — F51.04 PSYCHOPHYSIOLOGICAL INSOMNIA: ICD-10-CM

## 2021-07-27 DIAGNOSIS — E89.2 SURGICAL HYPOPARATHYROIDISM (HCC): ICD-10-CM

## 2021-07-27 DIAGNOSIS — Z00.00 ANNUAL PHYSICAL EXAM: Primary | ICD-10-CM

## 2021-07-27 DIAGNOSIS — R53.83 FATIGUE, UNSPECIFIED TYPE: ICD-10-CM

## 2021-07-27 DIAGNOSIS — Z12.31 ENCOUNTER FOR SCREENING MAMMOGRAM FOR MALIGNANT NEOPLASM OF BREAST: ICD-10-CM

## 2021-07-27 DIAGNOSIS — R50.9 FEVER, UNSPECIFIED FEVER CAUSE: ICD-10-CM

## 2021-07-27 DIAGNOSIS — Z00.00 LABORATORY EXAMINATION ORDERED AS PART OF A ROUTINE GENERAL MEDICAL EXAMINATION: ICD-10-CM

## 2021-07-27 DIAGNOSIS — R52 BODY ACHES: ICD-10-CM

## 2021-07-27 LAB
APPEARANCE: CLEAR
BILIRUBIN: NEGATIVE
GLUCOSE (URINE DIPSTICK): NEGATIVE MG/DL
LEUKOCYTES: NEGATIVE
MULTISTIX LOT#: 5007 NUMERIC
NITRITE, URINE: NEGATIVE
OCCULT BLOOD: NEGATIVE
PH, URINE: 6 (ref 4.5–8)
SPECIFIC GRAVITY: 1.01 (ref 1–1.03)
URINE-COLOR: YELLOW
UROBILINOGEN,SEMI-QN: 0.2 MG/DL (ref 0–1.9)

## 2021-07-27 PROCEDURE — 81003 URINALYSIS AUTO W/O SCOPE: CPT | Performed by: NURSE PRACTITIONER

## 2021-07-27 PROCEDURE — 3008F BODY MASS INDEX DOCD: CPT | Performed by: NURSE PRACTITIONER

## 2021-07-27 PROCEDURE — 99396 PREV VISIT EST AGE 40-64: CPT | Performed by: NURSE PRACTITIONER

## 2021-07-27 PROCEDURE — 3078F DIAST BP <80 MM HG: CPT | Performed by: NURSE PRACTITIONER

## 2021-07-27 PROCEDURE — 3074F SYST BP LT 130 MM HG: CPT | Performed by: NURSE PRACTITIONER

## 2021-07-27 RX ORDER — DOXYCYCLINE HYCLATE 100 MG/1
100 CAPSULE ORAL 2 TIMES DAILY
Qty: 42 CAPSULE | Refills: 0 | Status: SHIPPED | OUTPATIENT
Start: 2021-07-27 | End: 2021-08-09

## 2021-07-27 RX ORDER — DOXEPIN HYDROCHLORIDE 50 MG/1
CAPSULE ORAL
Qty: 90 CAPSULE | Refills: 3 | Status: SHIPPED | OUTPATIENT
Start: 2021-07-27 | End: 2021-07-27

## 2021-07-27 RX ORDER — DOXEPIN HYDROCHLORIDE 50 MG/1
CAPSULE ORAL
Qty: 90 CAPSULE | Refills: 3 | Status: SHIPPED | OUTPATIENT
Start: 2021-07-27

## 2021-07-27 NOTE — PROGRESS NOTES
Wellness Exam    CC: Patient is presenting for a wellness exam    HPI:   Concerns: noted fatigue, muscle aches, intermittent fevers (tmax 101) noted rash to posterior left arm with white area and then red ring around site.  She does not recall a tick bite b HYPOTHYROIDISM    • IBS    • Miscarriage 1990    had d&c   • Thyroid cancer [facility]) 1981    had thyroidectomy     Past Surgical History:   Procedure Laterality Date   • BREAST LUMPECTOMY Left 8/14/12    Left breast lumpectomy with wire loc at [facility] 5 MG Oral Tab, TAKE ONE TABLET BY MOUTH EVERY 8 HOURS AS NEEDED FOR MUSCLE SPASM, Disp: 30 tablet, Rfl: 2  zolpidem 5 MG Oral Tab, Take 1 tablet (5 mg total) by mouth nightly as needed. , Disp: 30 tablet, Rfl: 3  Ferrous Sulfate (IRON) 325 (65 Fe) MG Oral T kg/m²   Physical Exam   Constitutional: She is oriented to person, place, and time. She appears well-developed. No distress. Head: Normocephalic and atraumatic. Eyes: EOM are normal. Pupils are equal, round, and reactive to light. No scleral icterus.  Jimmy No Hemoglobin A1C          Standing Status: Future          Standing Expiration Date: 7/22/2022      Discussed use of sunscreen, wearing seatbelt, recommend regular cardiovascular and weight bearing exercise as well as a well-rounded diet.     Her 5 year prev

## 2021-07-28 ENCOUNTER — LAB ENCOUNTER (OUTPATIENT)
Dept: LAB | Age: 61
End: 2021-07-28
Attending: NURSE PRACTITIONER
Payer: COMMERCIAL

## 2021-07-28 DIAGNOSIS — Z00.00 LABORATORY EXAMINATION ORDERED AS PART OF A ROUTINE GENERAL MEDICAL EXAMINATION: ICD-10-CM

## 2021-07-28 DIAGNOSIS — R52 BODY ACHES: ICD-10-CM

## 2021-07-28 DIAGNOSIS — R50.9 FEVER, UNSPECIFIED FEVER CAUSE: ICD-10-CM

## 2021-07-28 DIAGNOSIS — R53.83 FATIGUE, UNSPECIFIED TYPE: ICD-10-CM

## 2021-07-28 LAB
ALBUMIN SERPL-MCNC: 3.7 G/DL (ref 3.4–5)
ALBUMIN/GLOB SERPL: 1 {RATIO} (ref 1–2)
ALP LIVER SERPL-CCNC: 230 U/L
ALT SERPL-CCNC: 167 U/L
ANION GAP SERPL CALC-SCNC: 8 MMOL/L (ref 0–18)
AST SERPL-CCNC: 71 U/L (ref 15–37)
BASOPHILS # BLD AUTO: 0.06 X10(3) UL (ref 0–0.2)
BASOPHILS NFR BLD AUTO: 1 %
BILIRUB SERPL-MCNC: 0.7 MG/DL (ref 0.1–2)
BUN BLD-MCNC: 15 MG/DL (ref 7–18)
BUN/CREAT SERPL: 33.3 (ref 10–20)
CALCIUM BLD-MCNC: 8.8 MG/DL (ref 8.5–10.1)
CHLORIDE SERPL-SCNC: 97 MMOL/L (ref 98–112)
CHOLEST SMN-MCNC: 213 MG/DL (ref ?–200)
CO2 SERPL-SCNC: 29 MMOL/L (ref 21–32)
CREAT BLD-MCNC: 0.45 MG/DL
DEPRECATED RDW RBC AUTO: 44.8 FL (ref 35.1–46.3)
EOSINOPHIL # BLD AUTO: 0.3 X10(3) UL (ref 0–0.7)
EOSINOPHIL NFR BLD AUTO: 5 %
ERYTHROCYTE [DISTWIDTH] IN BLOOD BY AUTOMATED COUNT: 13 % (ref 11–15)
EST. AVERAGE GLUCOSE BLD GHB EST-MCNC: 126 MG/DL (ref 68–126)
GLOBULIN PLAS-MCNC: 3.6 G/DL (ref 2.8–4.4)
GLUCOSE BLD-MCNC: 93 MG/DL (ref 70–99)
HBA1C MFR BLD HPLC: 6 % (ref ?–5.7)
HCT VFR BLD AUTO: 37.8 %
HDLC SERPL-MCNC: 43 MG/DL (ref 40–59)
HGB BLD-MCNC: 11.7 G/DL
IMM GRANULOCYTES # BLD AUTO: 0.03 X10(3) UL (ref 0–1)
IMM GRANULOCYTES NFR BLD: 0.5 %
LDLC SERPL CALC-MCNC: 157 MG/DL (ref ?–100)
LYMPHOCYTES # BLD AUTO: 0.97 X10(3) UL (ref 1–4)
LYMPHOCYTES NFR BLD AUTO: 16.2 %
M PROTEIN MFR SERPL ELPH: 7.3 G/DL (ref 6.4–8.2)
MCH RBC QN AUTO: 29.2 PG (ref 26–34)
MCHC RBC AUTO-ENTMCNC: 31 G/DL (ref 31–37)
MCV RBC AUTO: 94.3 FL
MONOCYTES # BLD AUTO: 0.33 X10(3) UL (ref 0.1–1)
MONOCYTES NFR BLD AUTO: 5.5 %
NEUTROPHILS # BLD AUTO: 4.3 X10 (3) UL (ref 1.5–7.7)
NEUTROPHILS # BLD AUTO: 4.3 X10(3) UL (ref 1.5–7.7)
NEUTROPHILS NFR BLD AUTO: 71.8 %
NONHDLC SERPL-MCNC: 170 MG/DL (ref ?–130)
OSMOLALITY SERPL CALC.SUM OF ELEC: 279 MOSM/KG (ref 275–295)
PATIENT FASTING Y/N/NP: YES
PATIENT FASTING Y/N/NP: YES
PLATELET # BLD AUTO: 255 10(3)UL (ref 150–450)
POTASSIUM SERPL-SCNC: 4 MMOL/L (ref 3.5–5.1)
RBC # BLD AUTO: 4.01 X10(6)UL
SODIUM SERPL-SCNC: 134 MMOL/L (ref 136–145)
TRIGL SERPL-MCNC: 73 MG/DL (ref 30–149)
VLDLC SERPL CALC-MCNC: 14 MG/DL (ref 0–30)
WBC # BLD AUTO: 6 X10(3) UL (ref 4–11)

## 2021-07-28 PROCEDURE — 85025 COMPLETE CBC W/AUTO DIFF WBC: CPT

## 2021-07-28 PROCEDURE — 36415 COLL VENOUS BLD VENIPUNCTURE: CPT

## 2021-07-28 PROCEDURE — 80053 COMPREHEN METABOLIC PANEL: CPT

## 2021-07-28 PROCEDURE — 80061 LIPID PANEL: CPT

## 2021-07-28 PROCEDURE — 83036 HEMOGLOBIN GLYCOSYLATED A1C: CPT

## 2021-07-29 ENCOUNTER — TELEPHONE (OUTPATIENT)
Dept: INTERNAL MEDICINE CLINIC | Facility: CLINIC | Age: 61
End: 2021-07-29

## 2021-07-29 DIAGNOSIS — R79.89 ELEVATED LFTS: Primary | ICD-10-CM

## 2021-07-29 NOTE — TELEPHONE ENCOUNTER
Hepatic function panel cancelled for today. LFT and Hep C labs to be drawn in 6 weeks per Clifton Sanchez.

## 2021-08-09 RX ORDER — DOXYCYCLINE HYCLATE 100 MG/1
CAPSULE ORAL
Qty: 42 CAPSULE | Refills: 0 | Status: SHIPPED | OUTPATIENT
Start: 2021-08-09

## 2021-08-12 ENCOUNTER — HOSPITAL ENCOUNTER (OUTPATIENT)
Dept: MAMMOGRAPHY | Age: 61
Discharge: HOME | End: 2021-08-12
Attending: NURSE PRACTITIONER
Payer: COMMERCIAL

## 2021-08-12 DIAGNOSIS — Z12.31 ENCOUNTER FOR SCREENING MAMMOGRAM FOR MALIGNANT NEOPLASM OF BREAST: ICD-10-CM

## 2021-08-12 PROCEDURE — 77067 SCR MAMMO BI INCL CAD: CPT | Performed by: NURSE PRACTITIONER

## 2021-08-12 PROCEDURE — 77063 BREAST TOMOSYNTHESIS BI: CPT | Performed by: NURSE PRACTITIONER

## 2021-09-10 ENCOUNTER — LAB ENCOUNTER (OUTPATIENT)
Dept: LAB | Age: 61
End: 2021-09-10
Attending: NURSE PRACTITIONER
Payer: COMMERCIAL

## 2021-09-10 DIAGNOSIS — R79.89 ELEVATED LFTS: ICD-10-CM

## 2021-09-10 PROBLEM — Z78.9 HEPATITIS C ANTIBODY TEST NEGATIVE: Status: ACTIVE | Noted: 2021-09-10

## 2021-09-10 LAB
ALBUMIN SERPL-MCNC: 4.6 G/DL (ref 3.4–5)
ALP LIVER SERPL-CCNC: 54 U/L
ALT SERPL-CCNC: 26 U/L
AST SERPL-CCNC: 23 U/L (ref 15–37)
BILIRUB DIRECT SERPL-MCNC: 0.1 MG/DL (ref 0–0.2)
BILIRUB SERPL-MCNC: 0.6 MG/DL (ref 0.1–2)
HCV AB SERPL QL IA: NONREACTIVE
M PROTEIN MFR SERPL ELPH: 7.5 G/DL (ref 6.4–8.2)

## 2021-09-10 PROCEDURE — 86803 HEPATITIS C AB TEST: CPT

## 2021-09-10 PROCEDURE — 36415 COLL VENOUS BLD VENIPUNCTURE: CPT

## 2021-09-10 PROCEDURE — 80076 HEPATIC FUNCTION PANEL: CPT

## 2021-09-16 ENCOUNTER — IMAGING SERVICES (OUTPATIENT)
Dept: GENERAL RADIOLOGY | Age: 61
End: 2021-09-16
Attending: PHYSICIAN ASSISTANT

## 2021-09-16 DIAGNOSIS — S90.32XA CONTUSION OF LEFT FOOT, INITIAL ENCOUNTER: ICD-10-CM

## 2021-09-16 DIAGNOSIS — S90.122A CONTUSION OF FOOT INCLUDING TOES, LEFT, INITIAL ENCOUNTER: ICD-10-CM

## 2021-09-16 DIAGNOSIS — S90.32XA CONTUSION OF FOOT INCLUDING TOES, LEFT, INITIAL ENCOUNTER: Primary | ICD-10-CM

## 2021-09-16 DIAGNOSIS — S90.32XA CONTUSION OF FOOT INCLUDING TOES, LEFT, INITIAL ENCOUNTER: ICD-10-CM

## 2021-09-16 DIAGNOSIS — S90.122A CONTUSION OF FOOT INCLUDING TOES, LEFT, INITIAL ENCOUNTER: Primary | ICD-10-CM

## 2021-09-16 PROCEDURE — 73630 X-RAY EXAM OF FOOT: CPT | Performed by: RADIOLOGY

## 2021-09-22 ENCOUNTER — TELEPHONE (OUTPATIENT)
Dept: URGENT CARE | Age: 61
End: 2021-09-22

## 2021-10-13 ENCOUNTER — LAB ENCOUNTER (OUTPATIENT)
Dept: LAB | Age: 61
End: 2021-10-13
Attending: INTERNAL MEDICINE
Payer: COMMERCIAL

## 2021-10-13 DIAGNOSIS — E89.0 POSTPROCEDURAL HYPOTHYROIDISM: ICD-10-CM

## 2021-10-13 PROCEDURE — 84480 ASSAY TRIIODOTHYRONINE (T3): CPT

## 2021-10-13 PROCEDURE — 36415 COLL VENOUS BLD VENIPUNCTURE: CPT

## 2021-10-13 PROCEDURE — 84439 ASSAY OF FREE THYROXINE: CPT

## 2021-10-13 PROCEDURE — 84443 ASSAY THYROID STIM HORMONE: CPT

## 2021-12-06 ENCOUNTER — LAB ENCOUNTER (OUTPATIENT)
Dept: LAB | Age: 61
End: 2021-12-06
Attending: INTERNAL MEDICINE
Payer: COMMERCIAL

## 2021-12-06 DIAGNOSIS — E89.0 POSTPROCEDURAL HYPOTHYROIDISM: ICD-10-CM

## 2021-12-06 DIAGNOSIS — E05.00 GRAVES DISEASE: ICD-10-CM

## 2021-12-06 PROCEDURE — 84439 ASSAY OF FREE THYROXINE: CPT

## 2021-12-06 PROCEDURE — 36415 COLL VENOUS BLD VENIPUNCTURE: CPT

## 2021-12-06 PROCEDURE — 84443 ASSAY THYROID STIM HORMONE: CPT

## 2021-12-06 PROCEDURE — 84481 FREE ASSAY (FT-3): CPT

## 2022-06-06 ENCOUNTER — LAB ENCOUNTER (OUTPATIENT)
Dept: LAB | Age: 62
End: 2022-06-06
Attending: INTERNAL MEDICINE
Payer: COMMERCIAL

## 2022-06-06 DIAGNOSIS — E55.9 VITAMIN D DEFICIENCY: ICD-10-CM

## 2022-06-06 DIAGNOSIS — C73 THYROID CANCER (HCC): ICD-10-CM

## 2022-06-06 DIAGNOSIS — E83.51 HYPOCALCEMIA: ICD-10-CM

## 2022-06-06 DIAGNOSIS — E89.0 POSTPROCEDURAL HYPOTHYROIDISM: ICD-10-CM

## 2022-06-06 LAB
ANION GAP SERPL CALC-SCNC: 7 MMOL/L (ref 0–18)
BUN BLD-MCNC: 26 MG/DL (ref 7–18)
CALCIUM BLD-MCNC: 8.9 MG/DL (ref 8.5–10.1)
CHLORIDE SERPL-SCNC: 105 MMOL/L (ref 98–112)
CO2 SERPL-SCNC: 30 MMOL/L (ref 21–32)
CREAT BLD-MCNC: 0.53 MG/DL
FASTING STATUS PATIENT QL REPORTED: YES
GLUCOSE BLD-MCNC: 87 MG/DL (ref 70–99)
OSMOLALITY SERPL CALC.SUM OF ELEC: 298 MOSM/KG (ref 275–295)
POTASSIUM SERPL-SCNC: 3.8 MMOL/L (ref 3.5–5.1)
SODIUM SERPL-SCNC: 142 MMOL/L (ref 136–145)
T3 SERPL-MCNC: 62 NG/DL (ref 60–181)
T4 FREE SERPL-MCNC: 1.3 NG/DL (ref 0.8–1.7)
THYROGLOB SERPL-MCNC: <15 U/ML (ref ?–60)
TSI SER-ACNC: 0.11 MIU/ML (ref 0.36–3.74)
VIT D+METAB SERPL-MCNC: 30.3 NG/ML (ref 30–100)

## 2022-06-06 PROCEDURE — 86800 THYROGLOBULIN ANTIBODY: CPT

## 2022-06-06 PROCEDURE — 84432 ASSAY OF THYROGLOBULIN: CPT

## 2022-06-06 PROCEDURE — 84480 ASSAY TRIIODOTHYRONINE (T3): CPT

## 2022-06-06 PROCEDURE — 36415 COLL VENOUS BLD VENIPUNCTURE: CPT

## 2022-06-06 PROCEDURE — 82306 VITAMIN D 25 HYDROXY: CPT

## 2022-06-06 PROCEDURE — 80048 BASIC METABOLIC PNL TOTAL CA: CPT

## 2022-06-06 PROCEDURE — 84443 ASSAY THYROID STIM HORMONE: CPT

## 2022-06-06 PROCEDURE — 84439 ASSAY OF FREE THYROXINE: CPT

## 2022-06-07 LAB
THYROGLOBULIN AB: <0.9 IU/ML
THYROGLOBULIN, SERUM OR PLASMA: 0.1 NG/ML

## 2022-06-29 DIAGNOSIS — F51.04 PSYCHOPHYSIOLOGICAL INSOMNIA: ICD-10-CM

## 2022-06-29 RX ORDER — DOXEPIN HYDROCHLORIDE 50 MG/1
CAPSULE ORAL
Qty: 90 CAPSULE | Refills: 0 | Status: SHIPPED | OUTPATIENT
Start: 2022-06-29

## 2022-06-29 NOTE — TELEPHONE ENCOUNTER
Doxepin 50 mg cap  Last time medication was refilled 4/19, 90 day supply through Mail service  Quantity and # of refills 90 cap no refill  Last OV 7/27/21  Next OV Due 7/22, no appt scheduled.  [employer] message sent notifying pt due for annual physicial

## 2022-07-29 ENCOUNTER — OFFICE VISIT (OUTPATIENT)
Dept: INTERNAL MEDICINE CLINIC | Facility: CLINIC | Age: 62
End: 2022-07-29
Payer: COMMERCIAL

## 2022-07-29 VITALS
TEMPERATURE: 98 F | HEIGHT: 67 IN | WEIGHT: 128 LBS | BODY MASS INDEX: 20.09 KG/M2 | OXYGEN SATURATION: 98 % | HEART RATE: 80 BPM | SYSTOLIC BLOOD PRESSURE: 102 MMHG | RESPIRATION RATE: 16 BRPM | DIASTOLIC BLOOD PRESSURE: 54 MMHG

## 2022-07-29 DIAGNOSIS — Z01.419 WELL WOMAN EXAM WITH ROUTINE GYNECOLOGICAL EXAM: ICD-10-CM

## 2022-07-29 DIAGNOSIS — Z00.00 ROUTINE PHYSICAL EXAMINATION: Primary | ICD-10-CM

## 2022-07-29 DIAGNOSIS — Z00.00 LABORATORY EXAM ORDERED AS PART OF ROUTINE GENERAL MEDICAL EXAMINATION: ICD-10-CM

## 2022-07-29 DIAGNOSIS — G57.02 PIRIFORMIS SYNDROME OF LEFT SIDE: ICD-10-CM

## 2022-07-29 DIAGNOSIS — Z12.31 ENCOUNTER FOR SCREENING MAMMOGRAM FOR BREAST CANCER: ICD-10-CM

## 2022-07-29 DIAGNOSIS — I83.893 SYMPTOMATIC VARICOSE VEINS OF BOTH LOWER EXTREMITIES: ICD-10-CM

## 2022-07-29 DIAGNOSIS — Z12.4 SCREENING FOR CERVICAL CANCER: ICD-10-CM

## 2022-07-29 PROCEDURE — 90471 IMMUNIZATION ADMIN: CPT | Performed by: PHYSICIAN ASSISTANT

## 2022-07-29 PROCEDURE — 99396 PREV VISIT EST AGE 40-64: CPT | Performed by: PHYSICIAN ASSISTANT

## 2022-07-29 PROCEDURE — 3078F DIAST BP <80 MM HG: CPT | Performed by: PHYSICIAN ASSISTANT

## 2022-07-29 PROCEDURE — 3074F SYST BP LT 130 MM HG: CPT | Performed by: PHYSICIAN ASSISTANT

## 2022-07-29 PROCEDURE — 90715 TDAP VACCINE 7 YRS/> IM: CPT | Performed by: PHYSICIAN ASSISTANT

## 2022-07-29 PROCEDURE — 3008F BODY MASS INDEX DOCD: CPT | Performed by: PHYSICIAN ASSISTANT

## 2022-07-29 RX ORDER — DIAZEPAM 5 MG/1
TABLET ORAL
Qty: 30 TABLET | Refills: 0 | Status: SHIPPED | OUTPATIENT
Start: 2022-07-29

## 2022-08-12 PROBLEM — Z91.89 10 YEAR RISK OF MI OR STROKE < 7.5%: Status: ACTIVE | Noted: 2022-08-12

## 2022-08-12 PROBLEM — E78.2 MODERATE MIXED HYPERLIPIDEMIA NOT REQUIRING STATIN THERAPY: Status: ACTIVE | Noted: 2022-08-12

## 2022-08-12 LAB
ABSOLUTE BASOPHILS: 51 CELLS/UL (ref 0–200)
ABSOLUTE EOSINOPHILS: 267 CELLS/UL (ref 15–500)
ABSOLUTE LYMPHOCYTES: 1122 CELLS/UL (ref 850–3900)
ABSOLUTE MONOCYTES: 237 CELLS/UL (ref 200–950)
ABSOLUTE NEUTROPHILS: 1323 CELLS/UL (ref 1500–7800)
ALBUMIN/GLOBULIN RATIO: 2.1 (CALC) (ref 1–2.5)
ALBUMIN: 4.4 G/DL (ref 3.6–5.1)
ALKALINE PHOSPHATASE: 45 U/L (ref 37–153)
ALT: 16 U/L (ref 6–29)
APPEARANCE: CLEAR
AST: 21 U/L (ref 10–35)
BASOPHILS: 1.7 %
BILIRUBIN, TOTAL: 0.4 MG/DL (ref 0.2–1.2)
BILIRUBIN: NEGATIVE
BUN: 23 MG/DL (ref 7–25)
CALCIUM: 9.5 MG/DL (ref 8.6–10.4)
CARBON DIOXIDE: 31 MMOL/L (ref 20–32)
CHLORIDE: 103 MMOL/L (ref 98–110)
CHOL/HDLC RATIO: 2.8 (CALC)
CHOLESTEROL, TOTAL: 268 MG/DL
COLOR: YELLOW
CREATININE: 0.6 MG/DL (ref 0.5–1.05)
EGFR: 101 ML/MIN/1.73M2
EOSINOPHILS: 8.9 %
GLOBULIN: 2.1 G/DL (CALC) (ref 1.9–3.7)
GLUCOSE: 97 MG/DL (ref 65–99)
GLUCOSE: NEGATIVE
HDL CHOLESTEROL: 97 MG/DL
HEMATOCRIT: 37.2 % (ref 35–45)
HEMOGLOBIN A1C: 5.3 % OF TOTAL HGB
HEMOGLOBIN: 12 G/DL (ref 11.7–15.5)
KETONES: NEGATIVE
LDL-CHOLESTEROL: 157 MG/DL (CALC)
LEUKOCYTE ESTERASE: NEGATIVE
LYMPHOCYTES: 37.4 %
MCH: 29.4 PG (ref 27–33)
MCHC: 32.3 G/DL (ref 32–36)
MCV: 91.2 FL (ref 80–100)
MONOCYTES: 7.9 %
MPV: 10.5 FL (ref 7.5–12.5)
NEUTROPHILS: 44.1 %
NITRITE: NEGATIVE
NON-HDL CHOLESTEROL: 171 MG/DL (CALC)
OCCULT BLOOD: NEGATIVE
PH: 6.5 (ref 5–8)
PLATELET COUNT: 203 THOUSAND/UL (ref 140–400)
POTASSIUM: 4 MMOL/L (ref 3.5–5.3)
PROTEIN, TOTAL: 6.5 G/DL (ref 6.1–8.1)
PROTEIN: NEGATIVE
RDW: 12.6 % (ref 11–15)
RED BLOOD CELL COUNT: 4.08 MILLION/UL (ref 3.8–5.1)
SODIUM: 141 MMOL/L (ref 135–146)
SPECIFIC GRAVITY: 1.01 (ref 1–1.03)
TRIGLYCERIDES: 47 MG/DL
WHITE BLOOD CELL COUNT: 3 THOUSAND/UL (ref 3.8–10.8)

## 2022-08-15 ENCOUNTER — HOSPITAL ENCOUNTER (OUTPATIENT)
Dept: MAMMOGRAPHY | Age: 62
Discharge: HOME | End: 2022-08-15
Attending: PHYSICIAN ASSISTANT
Payer: COMMERCIAL

## 2022-08-15 DIAGNOSIS — Z12.31 ENCOUNTER FOR SCREENING MAMMOGRAM FOR BREAST CANCER: ICD-10-CM

## 2022-08-15 PROCEDURE — 77063 BREAST TOMOSYNTHESIS BI: CPT | Performed by: PHYSICIAN ASSISTANT

## 2022-08-15 PROCEDURE — 77067 SCR MAMMO BI INCL CAD: CPT | Performed by: PHYSICIAN ASSISTANT

## 2022-10-11 DIAGNOSIS — F51.04 PSYCHOPHYSIOLOGICAL INSOMNIA: ICD-10-CM

## 2022-10-11 RX ORDER — DOXEPIN HYDROCHLORIDE 50 MG/1
CAPSULE ORAL
Qty: 90 CAPSULE | Refills: 1 | Status: SHIPPED | OUTPATIENT
Start: 2022-10-11

## 2022-10-11 NOTE — TELEPHONE ENCOUNTER
Doxepin 50 mg cap  Last time medication was refilled 7/5/22  Quantity and # of refills 90 cap 1 refill  Last OV 7/29/22 annual exam  Next OV Due 7/23 for annual exam

## 2022-12-19 ENCOUNTER — TELEMEDICINE (OUTPATIENT)
Dept: INTERNAL MEDICINE CLINIC | Facility: CLINIC | Age: 62
End: 2022-12-19
Payer: COMMERCIAL

## 2022-12-19 DIAGNOSIS — U07.1 COVID-19: Primary | ICD-10-CM

## 2023-04-08 DIAGNOSIS — F51.04 PSYCHOPHYSIOLOGICAL INSOMNIA: ICD-10-CM

## 2023-04-09 RX ORDER — DOXEPIN HYDROCHLORIDE 50 MG/1
CAPSULE ORAL
Qty: 90 CAPSULE | Refills: 1 | Status: SHIPPED | OUTPATIENT
Start: 2023-04-09

## 2023-04-15 DIAGNOSIS — G57.02 PIRIFORMIS SYNDROME OF LEFT SIDE: ICD-10-CM

## 2023-04-16 RX ORDER — DIAZEPAM 5 MG/1
TABLET ORAL
Qty: 30 TABLET | Refills: 0 | Status: SHIPPED | OUTPATIENT
Start: 2023-04-16

## 2023-04-18 ENCOUNTER — TELEPHONE (OUTPATIENT)
Dept: INTERNAL MEDICINE CLINIC | Facility: CLINIC | Age: 63
End: 2023-04-18

## 2023-04-18 NOTE — TELEPHONE ENCOUNTER
Spoke with BODØ at [facility] # given for Dr. Meka Adrian  Understanding verblaized

## 2023-07-31 ENCOUNTER — OFFICE VISIT (OUTPATIENT)
Dept: INTERNAL MEDICINE CLINIC | Facility: CLINIC | Age: 63
End: 2023-07-31
Payer: COMMERCIAL

## 2023-07-31 VITALS
TEMPERATURE: 98 F | SYSTOLIC BLOOD PRESSURE: 118 MMHG | HEART RATE: 98 BPM | HEIGHT: 67 IN | WEIGHT: 136 LBS | OXYGEN SATURATION: 98 % | DIASTOLIC BLOOD PRESSURE: 68 MMHG | BODY MASS INDEX: 21.35 KG/M2 | RESPIRATION RATE: 16 BRPM

## 2023-07-31 DIAGNOSIS — G47.62 NOCTURNAL LEG CRAMPS: ICD-10-CM

## 2023-07-31 DIAGNOSIS — Z00.00 ROUTINE PHYSICAL EXAMINATION: Primary | ICD-10-CM

## 2023-07-31 DIAGNOSIS — F51.04 PSYCHOPHYSIOLOGICAL INSOMNIA: ICD-10-CM

## 2023-07-31 DIAGNOSIS — Z85.850 HISTORY OF THYROID CANCER: ICD-10-CM

## 2023-07-31 DIAGNOSIS — L72.0 CYST OF SKIN AND SUBCUTANEOUS TISSUE: ICD-10-CM

## 2023-07-31 DIAGNOSIS — E89.2 SURGICAL HYPOPARATHYROIDISM (HCC): ICD-10-CM

## 2023-07-31 DIAGNOSIS — Z12.31 ENCOUNTER FOR SCREENING MAMMOGRAM FOR BREAST CANCER: ICD-10-CM

## 2023-07-31 DIAGNOSIS — Z00.00 LABORATORY EXAM ORDERED AS PART OF ROUTINE GENERAL MEDICAL EXAMINATION: ICD-10-CM

## 2023-07-31 PROCEDURE — 3078F DIAST BP <80 MM HG: CPT | Performed by: PHYSICIAN ASSISTANT

## 2023-07-31 PROCEDURE — 3074F SYST BP LT 130 MM HG: CPT | Performed by: PHYSICIAN ASSISTANT

## 2023-07-31 PROCEDURE — 3008F BODY MASS INDEX DOCD: CPT | Performed by: PHYSICIAN ASSISTANT

## 2023-07-31 PROCEDURE — 99396 PREV VISIT EST AGE 40-64: CPT | Performed by: PHYSICIAN ASSISTANT

## 2023-07-31 RX ORDER — LEVOTHYROXINE SODIUM 137 MCG
137 TABLET ORAL
Qty: 30 TABLET | Refills: 11 | COMMUNITY
Start: 2023-07-18 | End: 2024-07-17

## 2023-07-31 RX ORDER — LEVOTHYROXINE SODIUM 125 MCG
125 TABLET ORAL
Qty: 30 TABLET | Refills: 11 | COMMUNITY
Start: 2023-07-18 | End: 2024-07-17

## 2023-07-31 NOTE — PATIENT INSTRUCTIONS
Have labs drawn, fasting 8-10 hours prior (water before is ok).    Schedule mammogram on or after 8/15

## 2023-08-06 LAB
ABSOLUTE BASOPHILS: 40 CELLS/UL (ref 0–200)
ABSOLUTE EOSINOPHILS: 285 CELLS/UL (ref 15–500)
ABSOLUTE LYMPHOCYTES: 915 CELLS/UL (ref 850–3900)
ABSOLUTE MONOCYTES: 235 CELLS/UL (ref 200–950)
ABSOLUTE NEUTROPHILS: 1025 CELLS/UL (ref 1500–7800)
ALBUMIN/GLOBULIN RATIO: 2.3 (CALC) (ref 1–2.5)
ALBUMIN: 4.4 G/DL (ref 3.6–5.1)
ALKALINE PHOSPHATASE: 38 U/L (ref 37–153)
ALT: 17 U/L (ref 6–29)
APPEARANCE: CLEAR
AST: 22 U/L (ref 10–35)
BASOPHILS: 1.6 %
BILIRUBIN, TOTAL: 0.4 MG/DL (ref 0.2–1.2)
BILIRUBIN: NEGATIVE
BUN/CREATININE RATIO: 49 (CALC) (ref 6–22)
BUN: 28 MG/DL (ref 7–25)
CALCIUM: 8.7 MG/DL (ref 8.6–10.4)
CARBON DIOXIDE: 30 MMOL/L (ref 20–32)
CHLORIDE: 105 MMOL/L (ref 98–110)
CHOL/HDLC RATIO: 2.5 (CALC)
CHOLESTEROL, TOTAL: 240 MG/DL
COLOR: YELLOW
CREATININE: 0.57 MG/DL (ref 0.5–1.05)
EGFR: 101 ML/MIN/1.73M2
EOSINOPHILS: 11.4 %
GLOBULIN: 1.9 G/DL (CALC) (ref 1.9–3.7)
GLUCOSE: 99 MG/DL (ref 65–99)
GLUCOSE: NEGATIVE
HDL CHOLESTEROL: 96 MG/DL
HEMATOCRIT: 33.7 % (ref 35–45)
HEMOGLOBIN: 11.2 G/DL (ref 11.7–15.5)
KETONES: NEGATIVE
LDL-CHOLESTEROL: 129 MG/DL (CALC)
LEUKOCYTE ESTERASE: NEGATIVE
LYMPHOCYTES: 36.6 %
MCH: 31.1 PG (ref 27–33)
MCHC: 33.2 G/DL (ref 32–36)
MCV: 93.6 FL (ref 80–100)
MONOCYTES: 9.4 %
MPV: 10.7 FL (ref 7.5–12.5)
NEUTROPHILS: 41 %
NITRITE: NEGATIVE
NON-HDL CHOLESTEROL: 144 MG/DL (CALC)
OCCULT BLOOD: NEGATIVE
PH: 6 (ref 5–8)
PLATELET COUNT: 194 THOUSAND/UL (ref 140–400)
POTASSIUM: 4.2 MMOL/L (ref 3.5–5.3)
PROTEIN, TOTAL: 6.3 G/DL (ref 6.1–8.1)
PROTEIN: NEGATIVE
RDW: 13.1 % (ref 11–15)
RED BLOOD CELL COUNT: 3.6 MILLION/UL (ref 3.8–5.1)
SODIUM: 142 MMOL/L (ref 135–146)
SPECIFIC GRAVITY: 1.02 (ref 1–1.03)
TRIGLYCERIDES: 48 MG/DL
WHITE BLOOD CELL COUNT: 2.5 THOUSAND/UL (ref 3.8–10.8)

## 2023-08-16 ENCOUNTER — HOSPITAL ENCOUNTER (OUTPATIENT)
Dept: MAMMOGRAPHY | Age: 63
Discharge: HOME | End: 2023-08-16
Attending: PHYSICIAN ASSISTANT
Payer: COMMERCIAL

## 2023-08-16 DIAGNOSIS — Z12.31 ENCOUNTER FOR SCREENING MAMMOGRAM FOR BREAST CANCER: ICD-10-CM

## 2023-08-16 PROCEDURE — 77063 BREAST TOMOSYNTHESIS BI: CPT | Performed by: PHYSICIAN ASSISTANT

## 2023-08-16 PROCEDURE — 77067 SCR MAMMO BI INCL CAD: CPT | Performed by: PHYSICIAN ASSISTANT

## 2023-09-29 DIAGNOSIS — F51.04 PSYCHOPHYSIOLOGICAL INSOMNIA: ICD-10-CM

## 2023-10-02 RX ORDER — DOXEPIN HYDROCHLORIDE 50 MG/1
CAPSULE ORAL
Qty: 90 CAPSULE | Refills: 0 | Status: SHIPPED | OUTPATIENT
Start: 2023-10-02

## 2023-10-02 NOTE — TELEPHONE ENCOUNTER
Last time medication was refilled 04/09/2023  Quantity and # of refills 90 w/ 1  Last OV 07/31/2023  Next OV 08/02/2024      Medication not on protocol.      Sent to [facility] for approval.

## 2023-12-28 DIAGNOSIS — F51.04 PSYCHOPHYSIOLOGICAL INSOMNIA: ICD-10-CM

## 2023-12-28 RX ORDER — DOXEPIN HYDROCHLORIDE 50 MG/1
CAPSULE ORAL
Qty: 90 CAPSULE | Refills: 0 | Status: SHIPPED | OUTPATIENT
Start: 2023-12-28

## 2023-12-28 NOTE — TELEPHONE ENCOUNTER
Last time medication was refilled 10/02/2023  Quantity and # of refills 90 w 0  Last OV 07/31/2023  Next OV   Future Appointments   Date Time Provider Charmaine Cody   8/2/2024  9:00 AM Maru Schwab PA-C [facility]     Sent to [facility] for approval.

## 2024-03-26 DIAGNOSIS — F51.04 PSYCHOPHYSIOLOGICAL INSOMNIA: ICD-10-CM

## 2024-03-26 RX ORDER — DOXEPIN HYDROCHLORIDE 50 MG/1
CAPSULE ORAL
Qty: 90 CAPSULE | Refills: 0 | Status: SHIPPED | OUTPATIENT
Start: 2024-03-26

## 2024-03-26 NOTE — TELEPHONE ENCOUNTER
Last time medication was refilled 12/28/2023  Last OV 07/31/2023  Next OV due/scheduled   Future Appointments   Date Time Provider Department Center   8/2/2024  9:00 AM Maru Schwab PA-C [facility]       Medication not on protocol.

## 2024-08-05 ENCOUNTER — OFFICE VISIT (OUTPATIENT)
Dept: INTERNAL MEDICINE CLINIC | Facility: CLINIC | Age: 64
End: 2024-08-05
Payer: MEDICARE

## 2024-08-05 VITALS
HEART RATE: 70 BPM | OXYGEN SATURATION: 98 % | SYSTOLIC BLOOD PRESSURE: 110 MMHG | BODY MASS INDEX: 21.82 KG/M2 | DIASTOLIC BLOOD PRESSURE: 70 MMHG | RESPIRATION RATE: 14 BRPM | WEIGHT: 139 LBS | TEMPERATURE: 98 F | HEIGHT: 67 IN

## 2024-08-05 DIAGNOSIS — Z78.0 POSTMENOPAUSAL: ICD-10-CM

## 2024-08-05 DIAGNOSIS — Z00.00 ENCOUNTER FOR ANNUAL HEALTH EXAMINATION: Primary | ICD-10-CM

## 2024-08-05 DIAGNOSIS — F51.04 PSYCHOPHYSIOLOGICAL INSOMNIA: ICD-10-CM

## 2024-08-05 DIAGNOSIS — Z85.850 HISTORY OF THYROID CANCER: ICD-10-CM

## 2024-08-05 DIAGNOSIS — E89.2 SURGICAL HYPOPARATHYROIDISM (HCC): ICD-10-CM

## 2024-08-05 DIAGNOSIS — Z23 NEED FOR VACCINATION: ICD-10-CM

## 2024-08-05 DIAGNOSIS — R94.31 ABNORMAL EKG: ICD-10-CM

## 2024-08-05 DIAGNOSIS — E78.2 MODERATE MIXED HYPERLIPIDEMIA NOT REQUIRING STATIN THERAPY: ICD-10-CM

## 2024-08-05 DIAGNOSIS — G47.62 NOCTURNAL LEG CRAMPS: ICD-10-CM

## 2024-08-05 DIAGNOSIS — G57.02 PIRIFORMIS SYNDROME OF LEFT SIDE: ICD-10-CM

## 2024-08-05 DIAGNOSIS — Z12.31 VISIT FOR SCREENING MAMMOGRAM: ICD-10-CM

## 2024-08-05 DIAGNOSIS — Z79.899 ENCOUNTER FOR LONG-TERM (CURRENT) USE OF MEDICATIONS: ICD-10-CM

## 2024-08-05 LAB
ATRIAL RATE: 58 BPM
P AXIS: 19 DEGREES
P-R INTERVAL: 164 MS
Q-T INTERVAL: 438 MS
QRS DURATION: 84 MS
QTC CALCULATION (BEZET): 429 MS
R AXIS: 21 DEGREES
T AXIS: 22 DEGREES
VENTRICULAR RATE: 58 BPM

## 2024-08-05 RX ORDER — DIAZEPAM 5 MG/1
TABLET ORAL
Qty: 30 TABLET | Refills: 0 | Status: SHIPPED | OUTPATIENT
Start: 2024-08-05

## 2024-08-05 NOTE — PROGRESS NOTES
Subjective:   Jacy Becerril is a 64 year old female who presents for a  HRA  and scheduled follow up of multiple significant but stable problems.     Pt here for first medicare physical.  3.6 % ASCVD 10-year risk on lipid panel based on lipid panel from last year with LDL of 129.  Doing a lot of walking for exercise, has some OA L knee, doing better with PT.     History/Other:   Fall Risk Assessment:   She has been screened for Falls and is low risk.      Cognitive Assessment:   She had a completely normal cognitive assessment - see flowsheet entries       Functional Ability/Status:   Jacy Becerril has a completely normal functional assessment. See flowsheet for details.        Depression Screening (PHQ):  PHQ-2 SCORE: 0  , done 8/5/2024   Last [place] Suicide Screening on 8/5/2024 was No Risk.          Advanced Directives:   She does have a Living Will but we do NOT have it on file in Epic.    She does have a POA but we do NOT have it on file in Epic.    Discussed Advance Care Planning with patient (and family/surrogate if present). Standard forms made available to patient in After Visit Summary.      Patient Active Problem List   Diagnosis    Osteopenia    Surgical hypoparathyroidism (HCC)    Psychophysiological insomnia    History of thyroid cancer    Statin declined    Hepatitis C antibody test negative    10 year risk of MI or stroke < 7.5%    Moderate mixed hyperlipidemia not requiring statin therapy     Allergies:  She has No Known Allergies.    Current Medications:  Outpatient Medications Marked as Taking for the 8/5/24 encounter (Office Visit) with Maru Schwab PA-C   Medication Sig    diazePAM 5 MG Oral Tab TAKE ONE TABLET BY MOUTH EVERY 8 HOURS AS NEEDED FOR MUSCLE SPASM       Medical History:  She  has a past medical history of ANXIETY, Hypocalcemia, HYPOTHYROIDISM, IBS, Miscarriage ([facility]) (1990), and Thyroid cancer ([facility]) (1981).  Surgical History:  She  has a past surgical history that  includes hallux rigidus correction (2008); osteotomy 1st metatarsal,base/shaft (2008); removal deep implant (2009); thyroidectomy (); colonoscopy; d & c ();  (x3); other surgical history; Breast lumpectomy (Left, 2012); litzy biopsy stereo nodule 2 site bilat (cpt=19081/29309) (2012); colonoscopy (N/A, 2019); and litzy localization wire 1 site left (cpt=19281) ().   Family History:  Her family history includes Cancer in her maternal grandfather; Heart Disorder in her paternal grandfather and paternal grandmother; Lipids in her father; Other in her father, maternal grandmother, and sister; Psychiatric in her daughter, mother, and son.  Social History:  She  reports that she has never smoked. She has never used smokeless tobacco. She reports current alcohol use. She reports that she does not use drugs.    Tobacco:  She has never smoked tobacco.    CAGE Alcohol Screen:   CAGE screening score of 0 on 2024, showing low risk of alcohol abuse.      Patient Care Team:  Abisai Griffin MD as PCP - General (Internal Medicine)  Caitlin Last MD (OBSTETRICS & GYNECOLOGY)  Greta Zuniga MD (ENDOCRINOLOGY)    Review of Systems  GENERAL: feels well otherwise  SKIN: denies any unusual skin lesions  EYES: denies blurred vision or double vision  HEENT: denies nasal congestion, sinus pain or ST  LUNGS: denies shortness of breath with exertion  CARDIOVASCULAR: denies chest pain on exertion  GI: denies abdominal pain, denies heartburn  : denies dysuria, vaginal discharge or itching, no complaint of urinary incontinence   MUSCULOSKELETAL: denies back pain  NEURO: denies headaches  PSYCHE: denies depression or anxiety  HEMATOLOGIC: denies hx of anemia  ENDOCRINE: denies thyroid history  ALL/ASTHMA: denies hx of allergy or asthma    Objective:   Physical Exam  General Appearance:  Alert, cooperative, no distress, appears stated age   Head:  Normocephalic, without obvious abnormality,  atraumatic   Eyes:  PERRL, conjunctiva/corneas clear, EOM's intact both eyes   Ears:  Normal TM's and external ear canals, both ears   Nose: Nares normal, septum midline,mucosa normal, no drainage or sinus tenderness   Throat: Lips, mucosa, and tongue normal; teeth and gums normal   Neck: Supple, symmetrical, trachea midline, no adenopathy;  thyroid: not enlarged, symmetric, no tenderness/mass/nodules; no carotid bruit or JVD   Back:   Symmetric, no curvature, ROM normal, no CVA tenderness   Lungs:   Clear to auscultation bilaterally, respirations unlabored   Heart:  Regular rate and rhythm, S1 and S2 normal, no murmur, rub, or gallop   Abdomen:   Soft, non-tender, bowel sounds active all four quadrants,  no masses, no organomegaly   Pelvic: Deferred   Extremities: Extremities normal, atraumatic, no cyanosis or edema   Pulses: 2+ and symmetric   Skin: Skin color, texture, turgor normal, no rashes or lesions   Lymph nodes: Cervical, supraclavicular, and axillary nodes normal   Neurologic: Normal       /70   Pulse 70   Temp 98.1 °F (36.7 °C)   Resp 14   Ht 5' 7\" (1.702 m)   Wt 139 lb (63 kg)   LMP 03/01/2014   SpO2 98%   BMI 21.77 kg/m²  Estimated body mass index is 21.77 kg/m² as calculated from the following:    Height as of this encounter: 5' 7\" (1.702 m).    Weight as of this encounter: 139 lb (63 kg).    Medicare Hearing Assessment:   Hearing Screening    Time taken: 8/5/2024 11:10 AM  Screening Method: Whisper Test  Whisper Test Result: Pass         Visual Acuity:   Right Eye Visual Acuity: Uncorrected Right Eye Chart Acuity: 20/40   Left Eye Visual Acuity: Uncorrected Left Eye Chart Acuity: 20/30   Both Eyes Visual Acuity: Uncorrected Both Eyes Chart Acuity: 20/40   Able To Tolerate Visual Acuity: Yes        Assessment & Plan:   Jacy Becerril is a 64 year old female who presents for a Medicare Assessment.     1. Encounter for annual health examination (Primary)  2. Surgical hypoparathyroidism  (HCC)  Managed by endocrinology, stable  3. Moderate mixed hyperlipidemia not requiring statin therapy  -     EKG w/ Interpretation and Report - IN Office  EKG in office shows sinus bradycardia, rate 58. No ST changes or T wave inversion. Voltage meets criteria for LVH.   4. Visit for screening mammogram  -     3D Mammogram Digital Screen, Bilateral (CPT=77067/99160); Future; Expected date: 08/05/2024  5. Need for vaccination  -     PCV20 (Prevnar 20)  6. Postmenopausal  -     XR DEXA BONE DENSITOMETRY (CPT=77080); Future; Expected date: 08/05/2024  7. History of thyroid cancer  KAREN, followed by [facility].  8. Psychophysiological insomnia  Stable on doxepin. Takes ambien rarely.  9. Encounter for long-term (current) use of medications  -     CBC With Differential With Platelet  -     Comp Metabolic Panel (14)  -     Lipid Panel  -     Urinalysis, Routine  10. Nocturnal leg cramps  -     Magnesium  11. Abnormal EKG  Recommend echo due to possible LVH criteria on EKG today  -     CARD ECHO 2D DOPPLER (CPT=93306); Future; Expected date: 08/05/2024  12. Piriformis syndrome of left side  -     diazePAM; TAKE ONE TABLET BY MOUTH EVERY 8 HOURS AS NEEDED FOR MUSCLE SPASM  Dispense: 30 tablet; Refill: 0  The patient indicates understanding of these issues and agrees to the plan.  Reinforced healthy diet, lifestyle, and exercise.      Return in about 1 year (around 8/5/2025) for routine physical, or sooner as needed.     Maru Schwab PA-C, 8/5/2024     Supplementary Documentation:   General Health:  In the past six months, have you lost more than 10 pounds without trying?: 2 - No  Has your appetite been poor?: No  Type of Diet: Low Carb  How does the patient maintain a good energy level?: Daily Walks (6-8miles)  How would you describe your daily physical activity?: Moderate  How would you describe your current health state?: Good  How do you maintain positive mental well-being?: Social Interaction;Games;Visiting  Friends;Visiting Family  Have you had any immunizations at another office such as Influenza, Hepatitis B, Tetanus, or Pneumococcal?: No    Health Maintenance   Topic Date Due    COVID-19 Vaccine (4 - 2023-24 season) 09/01/2023    Annual Depression Screening  01/01/2024    Fall Risk Screening (Annual)  Never done    DEXA Scan  Never done    Pneumococcal Vaccine: 65+ Years (1 of 1 - PCV) Never done    Pap Smear  07/15/2024    Annual Physical  07/31/2024    Mammogram  08/16/2024    Influenza Vaccine (1) 10/01/2024    Colorectal Cancer Screening  09/14/2029    Zoster Vaccines  Completed

## 2024-08-07 ENCOUNTER — MED REC SCAN ONLY (OUTPATIENT)
Dept: INTERNAL MEDICINE CLINIC | Facility: CLINIC | Age: 64
End: 2024-08-07

## 2024-08-07 PROBLEM — G57.02 PIRIFORMIS SYNDROME OF LEFT SIDE: Status: ACTIVE | Noted: 2024-08-07

## 2024-08-13 ENCOUNTER — HOSPITAL ENCOUNTER (OUTPATIENT)
Dept: CV DIAGNOSTICS | Age: 64
Discharge: HOME | End: 2024-08-13
Attending: PHYSICIAN ASSISTANT
Payer: MEDICARE

## 2024-08-13 DIAGNOSIS — R94.31 ABNORMAL EKG: ICD-10-CM

## 2024-08-13 PROCEDURE — 93306 TTE W/DOPPLER COMPLETE: CPT | Performed by: PHYSICIAN ASSISTANT

## 2024-08-16 ENCOUNTER — HOSPITAL ENCOUNTER (OUTPATIENT)
Dept: BONE DENSITY | Age: 64
Discharge: HOME | End: 2024-08-16
Attending: PHYSICIAN ASSISTANT
Payer: MEDICARE

## 2024-08-16 ENCOUNTER — HOSPITAL ENCOUNTER (OUTPATIENT)
Dept: MAMMOGRAPHY | Age: 64
Discharge: HOME | End: 2024-08-16
Attending: PHYSICIAN ASSISTANT
Payer: MEDICARE

## 2024-08-16 DIAGNOSIS — M85.80 OSTEOPENIA, UNSPECIFIED LOCATION: Primary | ICD-10-CM

## 2024-08-16 DIAGNOSIS — Z78.0 POSTMENOPAUSAL: ICD-10-CM

## 2024-08-16 DIAGNOSIS — Z12.31 VISIT FOR SCREENING MAMMOGRAM: ICD-10-CM

## 2024-08-16 PROCEDURE — 77067 SCR MAMMO BI INCL CAD: CPT | Performed by: PHYSICIAN ASSISTANT

## 2024-08-16 PROCEDURE — 77063 BREAST TOMOSYNTHESIS BI: CPT | Performed by: PHYSICIAN ASSISTANT

## 2024-08-16 PROCEDURE — 77080 DXA BONE DENSITY AXIAL: CPT | Performed by: PHYSICIAN ASSISTANT

## 2024-08-20 LAB
ABSOLUTE BASOPHILS: 49 CELLS/UL (ref 0–200)
ABSOLUTE EOSINOPHILS: 220 CELLS/UL (ref 15–500)
ABSOLUTE LYMPHOCYTES: 1108 CELLS/UL (ref 850–3900)
ABSOLUTE MONOCYTES: 241 CELLS/UL (ref 200–950)
ABSOLUTE NEUTROPHILS: 1282 CELLS/UL (ref 1500–7800)
ALBUMIN/GLOBULIN RATIO: 1.9 (CALC) (ref 1–2.5)
ALBUMIN: 4.2 G/DL (ref 3.6–5.1)
ALKALINE PHOSPHATASE: 38 U/L (ref 37–153)
ALT: 16 U/L (ref 6–29)
APPEARANCE: CLEAR
AST: 20 U/L (ref 10–35)
BASOPHILS: 1.7 %
BILIRUBIN, TOTAL: 0.3 MG/DL (ref 0.2–1.2)
BILIRUBIN: NEGATIVE
BUN: 23 MG/DL (ref 7–25)
CALCIUM: 9.3 MG/DL (ref 8.6–10.4)
CARBON DIOXIDE: 30 MMOL/L (ref 20–32)
CHLORIDE: 103 MMOL/L (ref 98–110)
CHOL/HDLC RATIO: 3.2 (CALC)
CHOLESTEROL, TOTAL: 276 MG/DL
COLOR: YELLOW
CREATININE: 0.62 MG/DL (ref 0.5–1.05)
EGFR: 99 ML/MIN/1.73M2
EOSINOPHILS: 7.6 %
GLOBULIN: 2.2 G/DL (CALC) (ref 1.9–3.7)
GLUCOSE: 97 MG/DL (ref 65–99)
GLUCOSE: NEGATIVE
HDL CHOLESTEROL: 86 MG/DL
HEMATOCRIT: 35.8 % (ref 35–45)
HEMOGLOBIN: 11.4 G/DL (ref 11.7–15.5)
KETONES: NEGATIVE
LDL-CHOLESTEROL: 174 MG/DL (CALC)
LYMPHOCYTES: 38.2 %
MAGNESIUM: 2.3 MG/DL (ref 1.5–2.5)
MCH: 30.5 PG (ref 27–33)
MCHC: 31.8 G/DL (ref 32–36)
MCV: 95.7 FL (ref 80–100)
MONOCYTES: 8.3 %
MPV: 10.6 FL (ref 7.5–12.5)
NEUTROPHILS: 44.2 %
NITRITE: NEGATIVE
NON-HDL CHOLESTEROL: 190 MG/DL (CALC)
OCCULT BLOOD: NEGATIVE
PH: 6 (ref 5–8)
PLATELET COUNT: 223 THOUSAND/UL (ref 140–400)
POTASSIUM: 4.1 MMOL/L (ref 3.5–5.3)
PROTEIN, TOTAL: 6.4 G/DL (ref 6.1–8.1)
PROTEIN: NEGATIVE
RDW: 12.6 % (ref 11–15)
RED BLOOD CELL COUNT: 3.74 MILLION/UL (ref 3.8–5.1)
SODIUM: 143 MMOL/L (ref 135–146)
SPECIFIC GRAVITY: 1.02 (ref 1–1.03)
TRIGLYCERIDES: 64 MG/DL
VITAMIN D, 25-OH, TOTAL: 28 NG/ML (ref 30–100)
WHITE BLOOD CELL COUNT: 2.9 THOUSAND/UL (ref 3.8–10.8)

## 2024-09-20 ENCOUNTER — PATIENT MESSAGE (OUTPATIENT)
Dept: INTERNAL MEDICINE CLINIC | Facility: CLINIC | Age: 64
End: 2024-09-20

## 2024-09-20 DIAGNOSIS — F51.04 PSYCHOPHYSIOLOGICAL INSOMNIA: ICD-10-CM

## 2024-09-20 RX ORDER — DOXEPIN HYDROCHLORIDE 50 MG/1
CAPSULE ORAL
Qty: 90 CAPSULE | Refills: 0 | Status: SHIPPED | OUTPATIENT
Start: 2024-09-20

## 2024-09-20 NOTE — TELEPHONE ENCOUNTER
Last time medication was refilled 03/26/2024  Last office visit  08/05/2024  Next office visit due/scheduled No future appointments.    Medication not on protocol.

## 2024-09-20 NOTE — TELEPHONE ENCOUNTER
From: Jacy Becerril  To: Marukendra Schwab  Sent: 9/20/2024 8:50 AM CDT  Subject: Can you please send my prescription to [facility] instead of [facility]    Hello-  I am in need of a refill on the medication Doxepin HCL 50mg cap and it was originally sent to [facility]. I have been having a lot of issues with the mail order service. Would you please send the refills to the retail [facility] store in [place]? Thank you so much! I believe I can still do 30 day or 90 day supply.    [id number] - [place], [place] - [address] [place] [address] AT [facility], [phone], [phone]  [address] [place] [id number] [phone]

## 2024-12-17 DIAGNOSIS — F51.04 PSYCHOPHYSIOLOGICAL INSOMNIA: ICD-10-CM

## 2024-12-17 RX ORDER — DOXEPIN HYDROCHLORIDE 50 MG/1
CAPSULE ORAL
Qty: 90 CAPSULE | Refills: 0 | Status: SHIPPED | OUTPATIENT
Start: 2024-12-17

## 2024-12-17 NOTE — TELEPHONE ENCOUNTER
Last time medication was refilled 09/20/2024  Last office visit  08/05/2024  Next office visit due/scheduled No future appointments.    Medication not on protocol.

## 2025-03-12 LAB — AMB EXT OCCULT BLOOD RESULT: NEGATIVE

## 2025-04-07 ENCOUNTER — PATIENT MESSAGE (OUTPATIENT)
Dept: INTERNAL MEDICINE CLINIC | Facility: CLINIC | Age: 65
End: 2025-04-07

## 2025-04-07 DIAGNOSIS — F51.04 PSYCHOPHYSIOLOGICAL INSOMNIA: ICD-10-CM

## 2025-04-07 RX ORDER — DOXEPIN HYDROCHLORIDE 50 MG/1
CAPSULE ORAL
Qty: 90 CAPSULE | Refills: 0 | Status: SHIPPED | OUTPATIENT
Start: 2025-04-07

## 2025-04-07 NOTE — TELEPHONE ENCOUNTER
Last time medication was refilled 12/17/24  Last office visit  8/5/24  Next office visit due/scheduled none scheduled

## 2025-04-10 ENCOUNTER — TELEPHONE (OUTPATIENT)
Dept: INTERNAL MEDICINE CLINIC | Facility: CLINIC | Age: 65
End: 2025-04-10

## 2025-04-10 NOTE — TELEPHONE ENCOUNTER
FIT test results from [facility]- NORMAL. Reviewed by MELINDA Boyer. Result added to chart via external result console and [employer] message sent to patient regarding result.

## 2025-07-08 ENCOUNTER — RESULTS FOLLOW-UP (OUTPATIENT)
Dept: INTERNAL MEDICINE CLINIC | Facility: CLINIC | Age: 65
End: 2025-07-08

## 2025-07-08 ENCOUNTER — OFFICE VISIT (OUTPATIENT)
Dept: INTERNAL MEDICINE CLINIC | Facility: CLINIC | Age: 65
End: 2025-07-08
Payer: MEDICARE

## 2025-07-08 VITALS
RESPIRATION RATE: 14 BRPM | SYSTOLIC BLOOD PRESSURE: 112 MMHG | HEIGHT: 67 IN | OXYGEN SATURATION: 99 % | WEIGHT: 132 LBS | DIASTOLIC BLOOD PRESSURE: 70 MMHG | BODY MASS INDEX: 20.72 KG/M2 | HEART RATE: 104 BPM | TEMPERATURE: 99 F

## 2025-07-08 DIAGNOSIS — R05.1 ACUTE COUGH: Primary | ICD-10-CM

## 2025-07-08 DIAGNOSIS — L72.0 CYST OF SKIN AND SUBCUTANEOUS TISSUE: ICD-10-CM

## 2025-07-08 DIAGNOSIS — J01.00 ACUTE NON-RECURRENT MAXILLARY SINUSITIS: ICD-10-CM

## 2025-07-08 LAB
COVID19 BINAX NOW ANTIGEN: NOT DETECTED
POCT LOT NUMBER: NORMAL

## 2025-07-08 PROCEDURE — 1159F MED LIST DOCD IN RCRD: CPT | Performed by: PHYSICIAN ASSISTANT

## 2025-07-08 PROCEDURE — 99214 OFFICE O/P EST MOD 30 MIN: CPT | Performed by: PHYSICIAN ASSISTANT

## 2025-07-08 PROCEDURE — G2211 COMPLEX E/M VISIT ADD ON: HCPCS | Performed by: PHYSICIAN ASSISTANT

## 2025-07-08 PROCEDURE — 1160F RVW MEDS BY RX/DR IN RCRD: CPT | Performed by: PHYSICIAN ASSISTANT

## 2025-07-08 RX ORDER — BENZONATATE 200 MG/1
200 CAPSULE ORAL 3 TIMES DAILY PRN
Qty: 20 CAPSULE | Refills: 0 | Status: SHIPPED | OUTPATIENT
Start: 2025-07-08

## 2025-07-08 RX ORDER — PREDNISONE 20 MG/1
40 TABLET ORAL DAILY
Qty: 10 TABLET | Refills: 0 | Status: SHIPPED | OUTPATIENT
Start: 2025-07-08 | End: 2025-07-13

## 2025-07-08 NOTE — PROGRESS NOTES
The following individual(s) verbally consented to be recorded using ambient AI listening technology and understand that they can each withdraw their consent to this listening technology at any point by asking the clinician to turn off or pause the recording:    Patient name: Jacy Becerril  Additional names:  none

## 2025-07-08 NOTE — PATIENT INSTRUCTIONS
Mucinex (guafenesin 600mg in the morning) take with a full glass of water  Take antibiotic and prednisone as directed until completely finished. Contact us if you experience any adverse reaction to the medication.    Use tessalon up to 3 times daily, as needed, for cough- but especially at nighttime to help with sleep

## 2025-07-08 NOTE — PROGRESS NOTES
Jacy Becerril is a 65 year old female.  HPI:     History of Present Illness  Jacy Becerril is a 65 year old female who presents with a persistent cough and chest congestion following a recent cruise.    She has experienced a persistent cough and chest congestion for four to five days, which began during a family cruise to [place], [place], and [place]. Symptoms worsened significantly by Saturday after returning home. She describes a sensation of being 'under water' with frequent coughing, especially at night, and chest pressure. Coughing exacerbates her headaches.    By Sunday, symptoms intensified, causing difficulty sleeping due to the cough. She feels extremely tired and wakes up frequently. No wheezing is present, but lying down worsens the cough. She experiences a sensation of 'stuff' in her chest and pressure, but no significant ear pain. There is no shortness of breath.    During the cruise, she consulted a [facility] [employer] [place] pharmacist and was advised to take a medication that might be cough drops. She also purchased a green sinus pill from the cruise gift shop but is unsure of its efficacy. She is uncertain about which over-the-counter medications to take, such as Mucinex or Sudafed. She recalls a previous episode of COVID last October.     Also c/o swelling of front of right lower leg near a vein. Not painful. Noticed it last week.  Current Medications[1]   Past Medical History[2]   Social History:  Short Social Hx on File[3]     REVIEW OF SYSTEMS:   GENERAL HEALTH: feels well otherwise. Denies fever, chills, unintentional weight change  SKIN: denies any unusual skin lesions or rashes  RESPIRATORY: denies shortness of breath with exertion, denies cough or wheezing  CARDIOVASCULAR: denies chest pain or palpitations, denies leg swelling  GI: denies abdominal pain and denies heartburn. Denies nausea, vomiting, diarrhea, constipation  NEURO: denies headaches, dizziness, weakness, syncope    EXAM:   /70    Pulse 104   Temp 99.3 °F (37.4 °C)   Resp 14   Ht 5' 7\" (1.702 m)   Wt 132 lb (59.9 kg)   LMP 03/01/2014   SpO2 99%   BMI 20.67 kg/m²   GENERAL: well developed, well nourished,in no apparent distress  SKIN: no rashes,no suspicious lesions, warm and dry  HEENT: atraumatic, normocephalic,ears and throat are clear  NECK: supple,no adenopathy, no thyromegaly  LUNGS: clear to auscultation b/l no W/R/R  CARDIO: RRR without murmur  GI: good BS's,no masses, HSM, distension or tenderness  EXTREMITIES: no cyanosis, clubbing or edema  MUSCULOSKELETAL: FROM, no joint swelling or bony tenderness. R anterior lower leg + mobile 1cm non tender firm well defined round mass near varicose vein. No erythema, soft tissue swelling, or edema. Negative pedro's sign.  NEURO: a/ox3, no focal deficits  PSYCH: mood and affect normal    ASSESSMENT AND PLAN:   1. Acute cough (Primary)  -     COVID19 BinaxNOW Antigen  2. Acute non-recurrent maxillary sinusitis  -     Benzonatate; Take 1 capsule (200 mg total) by mouth 3 (three) times daily as needed for cough.  Dispense: 20 capsule; Refill: 0  -     Amoxicillin-Pot Clavulanate; Take 1 tablet by mouth 2 (two) times daily for 10 days.  Dispense: 20 tablet; Refill: 0  -     predniSONE; Take 2 tablets (40 mg total) by mouth daily for 5 days.  Dispense: 10 tablet; Refill: 0  3. Cyst of skin and subcutaneous tissue     In office COVID test is negative  Start augmentin, prednisone, prn tessalon. Add otc guaifenesin qam.   Reassured R anterior leg c/w cyst. Asymptomatic. Monitor.     The patient indicates understanding of these issues and agrees to the plan.  Return if symptoms worsen or fail to improve.        [1]   Current Outpatient Medications   Medication Sig Dispense Refill    benzonatate 200 MG Oral Cap Take 1 capsule (200 mg total) by mouth 3 (three) times daily as needed for cough. 20 capsule 0    amoxicillin clavulanate 875-125 MG Oral Tab Take 1 tablet by mouth 2 (two) times daily for  10 days. 20 tablet 0    predniSONE 20 MG Oral Tab Take 2 tablets (40 mg total) by mouth daily for 5 days. 10 tablet 0    UNITHROID 137 MCG Oral Tab TAKE ONE TAB 2 DAYS PER WEEK      UNITHROID 125 MCG Oral Tab TAKE ONE TAB 5 DAYS PER WEEK      doxepin 50 MG Oral Cap TAKE 1 CAPSULE BY MOUTH EVERY DAY AT NIGHT 90 capsule 0    diazePAM 5 MG Oral Tab TAKE ONE TABLET BY MOUTH EVERY 8 HOURS AS NEEDED FOR MUSCLE SPASM 30 tablet 0    zolpidem 5 MG Oral Tab Take 1 tablet (5 mg total) by mouth nightly as needed. 30 tablet 3    Potassium Citrate ER 15 MEQ (1620 MG) Oral Tab CR Take 2 tablets by mouth daily.      Melatonin 5 MG Oral Tab Take 1 tablet (5 mg total) by mouth nightly.      Omega-3 Fatty Acids (FISH OIL OR) Take 1 tablet by mouth daily.        Magnesium 500 MG Oral Cap Take 1 tablet by mouth daily.        Cyanocobalamin (VITAMIN B 12 OR) Take 1 tablet by mouth daily.        CALCIUM + D OR 2 tabs daily      MULTIPLE VITAMIN OR CAPS daily     [2]   Past Medical History:   ANXIETY    Hypocalcemia    HYPOTHYROIDISM    IBS    Miscarriage (HCC)    had d&c    Thyroid cancer (HCC)    had thyroidectomy   [3]   Social History  Socioeconomic History    Marital status:    Tobacco Use    Smoking status: Never    Smokeless tobacco: Never   Vaping Use    Vaping status: Never Used   Substance and Sexual Activity    Alcohol use: Yes     Alcohol/week: 0.0 standard drinks of alcohol     Comment: seldom    Drug use: No

## 2025-08-01 DIAGNOSIS — F51.04 PSYCHOPHYSIOLOGICAL INSOMNIA: ICD-10-CM

## 2025-08-01 RX ORDER — DOXEPIN HYDROCHLORIDE 50 MG/1
50 CAPSULE ORAL NIGHTLY
Qty: 90 CAPSULE | Refills: 0 | Status: SHIPPED | OUTPATIENT
Start: 2025-08-01

## 2025-08-13 ENCOUNTER — OFFICE VISIT (OUTPATIENT)
Dept: INTERNAL MEDICINE CLINIC | Facility: CLINIC | Age: 65
End: 2025-08-13

## 2025-08-13 VITALS
WEIGHT: 138 LBS | TEMPERATURE: 98 F | HEART RATE: 78 BPM | HEIGHT: 67 IN | BODY MASS INDEX: 21.66 KG/M2 | SYSTOLIC BLOOD PRESSURE: 118 MMHG | OXYGEN SATURATION: 99 % | DIASTOLIC BLOOD PRESSURE: 60 MMHG | RESPIRATION RATE: 18 BRPM

## 2025-08-13 DIAGNOSIS — E89.2 SURGICAL HYPOPARATHYROIDISM (HCC): ICD-10-CM

## 2025-08-13 DIAGNOSIS — Z53.20 STATIN DECLINED: ICD-10-CM

## 2025-08-13 DIAGNOSIS — Z91.89 10 YEAR RISK OF MI OR STROKE < 7.5%: ICD-10-CM

## 2025-08-13 DIAGNOSIS — Z00.00 ENCOUNTER FOR ANNUAL HEALTH EXAMINATION: Primary | ICD-10-CM

## 2025-08-13 DIAGNOSIS — F51.04 PSYCHOPHYSIOLOGICAL INSOMNIA: ICD-10-CM

## 2025-08-13 DIAGNOSIS — M85.89 OSTEOPENIA OF MULTIPLE SITES: ICD-10-CM

## 2025-08-13 DIAGNOSIS — F51.04 CHRONIC INSOMNIA: ICD-10-CM

## 2025-08-13 DIAGNOSIS — R06.81 APNEA: ICD-10-CM

## 2025-08-13 DIAGNOSIS — E78.2 MODERATE MIXED HYPERLIPIDEMIA NOT REQUIRING STATIN THERAPY: ICD-10-CM

## 2025-08-13 DIAGNOSIS — Z85.850 HISTORY OF THYROID CANCER: ICD-10-CM

## 2025-08-13 RX ORDER — GINSENG 100 MG
CAPSULE ORAL
COMMUNITY
Start: 2025-01-01

## 2025-08-13 RX ORDER — DICLOFENAC SODIUM 75 MG/1
75 TABLET, DELAYED RELEASE ORAL 2 TIMES DAILY
COMMUNITY
Start: 2025-08-07

## 2025-08-18 ENCOUNTER — HOSPITAL ENCOUNTER (OUTPATIENT)
Dept: MAMMOGRAPHY | Age: 65
Discharge: HOME | End: 2025-08-18
Attending: PHYSICIAN ASSISTANT

## 2025-08-18 ENCOUNTER — PATIENT MESSAGE (OUTPATIENT)
Dept: INTERNAL MEDICINE CLINIC | Facility: CLINIC | Age: 65
End: 2025-08-18

## 2025-08-18 ENCOUNTER — TELEPHONE (OUTPATIENT)
Dept: INTERNAL MEDICINE CLINIC | Facility: CLINIC | Age: 65
End: 2025-08-18

## 2025-08-18 DIAGNOSIS — Z12.31 ENCOUNTER FOR MAMMOGRAM TO ESTABLISH BASELINE MAMMOGRAM: ICD-10-CM

## 2025-08-18 DIAGNOSIS — Z12.31 SCREENING MAMMOGRAM, ENCOUNTER FOR: Primary | ICD-10-CM

## 2025-08-20 ENCOUNTER — TELEPHONE (OUTPATIENT)
Dept: SLEEP CENTER | Age: 65
End: 2025-08-20

## 2025-08-24 ENCOUNTER — PATIENT MESSAGE (OUTPATIENT)
Dept: INTERNAL MEDICINE CLINIC | Facility: CLINIC | Age: 65
End: 2025-08-24

## 2025-08-24 DIAGNOSIS — F51.04 PSYCHOPHYSIOLOGICAL INSOMNIA: ICD-10-CM

## 2025-08-25 ENCOUNTER — HOSPITAL ENCOUNTER (OUTPATIENT)
Dept: MAMMOGRAPHY | Age: 65
Discharge: HOME | End: 2025-08-25
Attending: PHYSICIAN ASSISTANT

## 2025-08-25 DIAGNOSIS — Z12.31 SCREENING MAMMOGRAM, ENCOUNTER FOR: ICD-10-CM

## 2025-08-25 PROCEDURE — 77063 BREAST TOMOSYNTHESIS BI: CPT | Performed by: PHYSICIAN ASSISTANT

## 2025-08-25 PROCEDURE — 77067 SCR MAMMO BI INCL CAD: CPT | Performed by: PHYSICIAN ASSISTANT

## 2025-08-25 RX ORDER — DOXEPIN HYDROCHLORIDE 50 MG/1
50 CAPSULE ORAL NIGHTLY
Qty: 90 CAPSULE | Refills: 0 | Status: SHIPPED | OUTPATIENT
Start: 2025-08-25

## (undated) NOTE — MR AVS SNAPSHOT
[address]  [address] [address] [phone]               Thank you for choosing us for your health care visit with Ariela Blanchard MD.  We are glad to serve you and happy to provide you with this tao · Also avoid alcohol for at least 4 to 6 hours before bedtime. It may help you fall asleep at first, but you will have more awakenings throughout the night, and your sleep will not be restful.   Before bedtime  To sleep better every night, try these tips: TAKE ONE TABLET BY MOUTH EVERY 8 HOURS AS NEEDED FOR MUSCLE SPASM   What changed:  See the new instructions. Commonly known as:  VALIUM           Doxepin HCl 25 MG Caps   Take 1 capsule (25 mg total) by mouth nightly.    Commonly known as:  SINEQUAN

## (undated) NOTE — MR AVS SNAPSHOT
After Visit Summary   10/15/2017    Christin Marroquin    MRN: [id number]           Visit Information     Date & Time  10/15/2017  7:00 PM Provider  [address] Dept[address]  Phone  [phone]      Allergies as non-emergency, consider your options before heading to an ER.          SAME DAY  APPOINTMENTS  Available at primary care offices      [facility]     Treatment for mild  illness or inj